# Patient Record
Sex: FEMALE | Race: ASIAN | Employment: FULL TIME | URBAN - METROPOLITAN AREA
[De-identification: names, ages, dates, MRNs, and addresses within clinical notes are randomized per-mention and may not be internally consistent; named-entity substitution may affect disease eponyms.]

---

## 2020-09-24 ENCOUNTER — TELEPHONE (OUTPATIENT)
Dept: INTERNAL MEDICINE CLINIC | Facility: CLINIC | Age: 45
End: 2020-09-24

## 2020-09-24 ENCOUNTER — OFFICE VISIT (OUTPATIENT)
Dept: INTERNAL MEDICINE CLINIC | Facility: CLINIC | Age: 45
End: 2020-09-24
Payer: COMMERCIAL

## 2020-09-24 VITALS
HEART RATE: 80 BPM | HEIGHT: 62.75 IN | TEMPERATURE: 99 F | WEIGHT: 152 LBS | DIASTOLIC BLOOD PRESSURE: 66 MMHG | BODY MASS INDEX: 27.27 KG/M2 | SYSTOLIC BLOOD PRESSURE: 100 MMHG | RESPIRATION RATE: 16 BRPM | OXYGEN SATURATION: 100 %

## 2020-09-24 DIAGNOSIS — Z00.00 ENCOUNTER FOR ROUTINE ADULT MEDICAL EXAMINATION: Primary | ICD-10-CM

## 2020-09-24 DIAGNOSIS — Z00.00 LABORATORY EXAM ORDERED AS PART OF ROUTINE GENERAL MEDICAL EXAMINATION: ICD-10-CM

## 2020-09-24 DIAGNOSIS — E78.5 DYSLIPIDEMIA: ICD-10-CM

## 2020-09-24 DIAGNOSIS — Z12.31 ENCOUNTER FOR SCREENING MAMMOGRAM FOR BREAST CANCER: ICD-10-CM

## 2020-09-24 DIAGNOSIS — Z23 NEED FOR INFLUENZA VACCINATION: ICD-10-CM

## 2020-09-24 DIAGNOSIS — B36.9 FUNGAL RASH OF TRUNK: ICD-10-CM

## 2020-09-24 PROCEDURE — 3008F BODY MASS INDEX DOCD: CPT | Performed by: INTERNAL MEDICINE

## 2020-09-24 PROCEDURE — 3078F DIAST BP <80 MM HG: CPT | Performed by: INTERNAL MEDICINE

## 2020-09-24 PROCEDURE — 3074F SYST BP LT 130 MM HG: CPT | Performed by: INTERNAL MEDICINE

## 2020-09-24 PROCEDURE — 90686 IIV4 VACC NO PRSV 0.5 ML IM: CPT | Performed by: INTERNAL MEDICINE

## 2020-09-24 PROCEDURE — 99386 PREV VISIT NEW AGE 40-64: CPT | Performed by: INTERNAL MEDICINE

## 2020-09-24 PROCEDURE — 90471 IMMUNIZATION ADMIN: CPT | Performed by: INTERNAL MEDICINE

## 2020-09-24 RX ORDER — ATORVASTATIN CALCIUM 10 MG/1
TABLET, FILM COATED ORAL
COMMUNITY
End: 2020-12-03

## 2020-09-24 NOTE — TELEPHONE ENCOUNTER
Faxed signed medical records request to Dr. Amilcar Figueroa office to obtain pt's medical records     Fax number [phone]     Fax confirmation received     Sent to scan     Awaiting fax

## 2020-09-24 NOTE — PROGRESS NOTES
[facility] Internal Medicine Office Note  Chief Complaint:   Patient presents with:  New Patient  Physical      HPI:   This is a 50year old female coming in for establishing care and physical  HPI    PMH: high cholesterol, on atorvastatin  [facility] Position: Sitting, Cuff Size: adult)   Pulse 80   Temp 98.6 °F (37 °C) (Oral)   Resp 16   Ht 62.75\"   Wt 152 lb (68.9 kg)   SpO2 100%   BMI 27.14 kg/m²  Estimated body mass index is 27.14 kg/m² as calculated from the following:    Height as of this encoun Future  -     COMP METABOLIC PANEL (14); Future    Encounter for screening mammogram for breast cancer  -     OBG - INTERNAL  -     [facility] CIRO 2D+3D SCREENING BILAT (CPT=77067/14050);  Future    Fungal rash of trunk -start otc clotrimazole and f/u derm if per

## 2020-09-24 NOTE — PATIENT INSTRUCTIONS
Call central scheduling to make appointment for mammogram and blood work [phone]    Follow up with gynecology for pap smear       Clotrimazole 1% over the counter - apply twice daily to affected area   Follow up with dermatology if symptoms persist

## 2020-09-30 ENCOUNTER — TELEPHONE (OUTPATIENT)
Dept: INTERNAL MEDICINE CLINIC | Facility: CLINIC | Age: 45
End: 2020-09-30

## 2020-09-30 NOTE — TELEPHONE ENCOUNTER
Please call patient that we have not received blood work results that are required for her form.  Please remind her to schedule lab appt

## 2020-10-02 ENCOUNTER — HOSPITAL ENCOUNTER (OUTPATIENT)
Dept: MAMMOGRAPHY | Age: 45
Discharge: HOME | End: 2020-10-02
Attending: INTERNAL MEDICINE
Payer: COMMERCIAL

## 2020-10-02 ENCOUNTER — LAB ENCOUNTER (OUTPATIENT)
Dept: LAB | Age: 45
End: 2020-10-02
Attending: INTERNAL MEDICINE
Payer: COMMERCIAL

## 2020-10-02 DIAGNOSIS — Z12.31 ENCOUNTER FOR SCREENING MAMMOGRAM FOR BREAST CANCER: ICD-10-CM

## 2020-10-02 DIAGNOSIS — Z00.00 LABORATORY EXAM ORDERED AS PART OF ROUTINE GENERAL MEDICAL EXAMINATION: ICD-10-CM

## 2020-10-02 PROCEDURE — 36415 COLL VENOUS BLD VENIPUNCTURE: CPT

## 2020-10-02 PROCEDURE — 84443 ASSAY THYROID STIM HORMONE: CPT

## 2020-10-02 PROCEDURE — 80053 COMPREHEN METABOLIC PANEL: CPT

## 2020-10-02 PROCEDURE — 85025 COMPLETE CBC W/AUTO DIFF WBC: CPT

## 2020-10-02 PROCEDURE — 77067 SCR MAMMO BI INCL CAD: CPT | Performed by: INTERNAL MEDICINE

## 2020-10-02 PROCEDURE — 77063 BREAST TOMOSYNTHESIS BI: CPT | Performed by: INTERNAL MEDICINE

## 2020-10-02 PROCEDURE — 80061 LIPID PANEL: CPT

## 2020-10-06 NOTE — TELEPHONE ENCOUNTER
Called and left message for pt to give office a call back     BP Physician Certification Form completed    Would pt like for us to fax form to the number provided and mail copy to her? OR   Would she like to ?

## 2020-10-06 NOTE — TELEPHONE ENCOUNTER
Faxed wellness form to [employer]     Fax number [phone]     Fax confirmation received     Sent to scan, copy placed in accordion   Copy mailed to pt address on file

## 2020-12-03 RX ORDER — ATORVASTATIN CALCIUM 10 MG/1
TABLET, FILM COATED ORAL
Qty: 90 TABLET | Refills: 3 | Status: SHIPPED | OUTPATIENT
Start: 2020-12-03 | End: 2021-11-26

## 2020-12-03 NOTE — TELEPHONE ENCOUNTER
Patient is requesting a refill on her prescription for atorvastatin 10 MG Oral Tab. [facility] [phone], [phone].

## 2020-12-03 NOTE — TELEPHONE ENCOUNTER
LOV: 9/24/2020 with Dr. Radha Reynoso  RTC: no follow-up on file  Last Relevant Labs: 10/2/2020   Historical document in med list.     Future Appointments   Date Time Provider Esha Castaneda   1/14/2021  3:30 PM Flo Ornelas MD [facility]

## 2021-03-17 ENCOUNTER — TELEPHONE (OUTPATIENT)
Dept: INTERNAL MEDICINE CLINIC | Facility: CLINIC | Age: 46
End: 2021-03-17

## 2021-03-17 NOTE — TELEPHONE ENCOUNTER
Patient wondering if she is safe to get covid vaccine due to blood thinners during first pregnancy 12 years ago.  Please advise

## 2021-08-05 ENCOUNTER — OFFICE VISIT (OUTPATIENT)
Dept: OBGYN CLINIC | Facility: CLINIC | Age: 46
End: 2021-08-05
Payer: COMMERCIAL

## 2021-08-05 VITALS
SYSTOLIC BLOOD PRESSURE: 112 MMHG | WEIGHT: 149.38 LBS | DIASTOLIC BLOOD PRESSURE: 66 MMHG | HEIGHT: 62.5 IN | BODY MASS INDEX: 26.8 KG/M2

## 2021-08-05 DIAGNOSIS — Z12.31 ENCOUNTER FOR SCREENING MAMMOGRAM FOR BREAST CANCER: ICD-10-CM

## 2021-08-05 DIAGNOSIS — Z01.419 WELL WOMAN EXAM WITH ROUTINE GYNECOLOGICAL EXAM: Primary | ICD-10-CM

## 2021-08-05 DIAGNOSIS — Z12.4 CERVICAL CANCER SCREENING: ICD-10-CM

## 2021-08-05 PROCEDURE — 3078F DIAST BP <80 MM HG: CPT | Performed by: NURSE PRACTITIONER

## 2021-08-05 PROCEDURE — 3008F BODY MASS INDEX DOCD: CPT | Performed by: NURSE PRACTITIONER

## 2021-08-05 PROCEDURE — 87624 HPV HI-RISK TYP POOLED RSLT: CPT | Performed by: NURSE PRACTITIONER

## 2021-08-05 PROCEDURE — 99386 PREV VISIT NEW AGE 40-64: CPT | Performed by: NURSE PRACTITIONER

## 2021-08-05 PROCEDURE — 3074F SYST BP LT 130 MM HG: CPT | Performed by: NURSE PRACTITIONER

## 2021-08-05 PROCEDURE — 88175 CYTOPATH C/V AUTO FLUID REDO: CPT | Performed by: NURSE PRACTITIONER

## 2021-08-05 NOTE — PROGRESS NOTES
Here for new gynecology visit. 48year old G 4 P 2. Patient's last menstrual period was 05/07/2021. [facility] Here for Annual Gynecologic Exam. No concerns or questions. Low back bone pain as well as left hip pain when she lays on it.     Menses have been irre past.  Extremities:  No pain, swelling, arthralgias, joint swelling. Neurological:  No headaches, depression, anxiety, migraines, seizure disorders, behavioral problems.                /66   Ht 62.5\"   Wt 149 lb 6.4 oz (67.8 kg)   LMP 05/07/2021   B

## 2021-08-12 LAB — HPV I/H RISK 1 DNA SPEC QL NAA+PROBE: NEGATIVE

## 2021-08-30 ENCOUNTER — OFFICE VISIT (OUTPATIENT)
Dept: INTERNAL MEDICINE CLINIC | Facility: CLINIC | Age: 46
End: 2021-08-30
Payer: COMMERCIAL

## 2021-08-30 VITALS
DIASTOLIC BLOOD PRESSURE: 66 MMHG | WEIGHT: 153.63 LBS | RESPIRATION RATE: 16 BRPM | SYSTOLIC BLOOD PRESSURE: 122 MMHG | TEMPERATURE: 99 F | BODY MASS INDEX: 27.57 KG/M2 | OXYGEN SATURATION: 100 % | HEART RATE: 80 BPM | HEIGHT: 62.5 IN

## 2021-08-30 DIAGNOSIS — B35.4 TINEA CORPORIS: ICD-10-CM

## 2021-08-30 DIAGNOSIS — M25.552 LEFT HIP PAIN: Primary | ICD-10-CM

## 2021-08-30 DIAGNOSIS — Z00.00 LABORATORY EXAM ORDERED AS PART OF ROUTINE GENERAL MEDICAL EXAMINATION: ICD-10-CM

## 2021-08-30 DIAGNOSIS — M25.572 ACUTE LEFT ANKLE PAIN: ICD-10-CM

## 2021-08-30 PROCEDURE — 3074F SYST BP LT 130 MM HG: CPT | Performed by: INTERNAL MEDICINE

## 2021-08-30 PROCEDURE — 99213 OFFICE O/P EST LOW 20 MIN: CPT | Performed by: INTERNAL MEDICINE

## 2021-08-30 PROCEDURE — 3008F BODY MASS INDEX DOCD: CPT | Performed by: INTERNAL MEDICINE

## 2021-08-30 PROCEDURE — 3078F DIAST BP <80 MM HG: CPT | Performed by: INTERNAL MEDICINE

## 2021-08-30 RX ORDER — CLOTRIMAZOLE 1 %
CREAM (GRAM) TOPICAL
Qty: 40 G | Refills: 0 | Status: SHIPPED | OUTPATIENT
Start: 2021-08-30

## 2021-08-30 RX ORDER — MULTIVIT-MIN/IRON/FOLIC ACID/K 18-600-40
CAPSULE ORAL
COMMUNITY
Start: 2020-10-31

## 2021-08-30 NOTE — PROGRESS NOTES
[facility] Internal Medicine Office Note  Chief Complaint:   Patient presents with:  Physical      HPI:   This is a 48year old female coming in for physical     HPI    She had pap smear 8/2021 with gynecology    covid 3/19/21 and 4/9/21 Pfizer disturbance. Respiratory: Negative for shortness of breath. Cardiovascular: Negative for chest pain. Gastrointestinal: Negative for constipation. Genitourinary: Negative for dysuria. Neurological: Negative. Hematological: Negative.     Psychia pains are due to the statin. Ok to trial off of statin for 2 weeks   -     ORTHOPEDIC - INTERNAL    Acute left ankle pain - for past 2 days; anti-inflammatory as above. Normal exam, normal ROM.    -     ORTHOPEDIC - INTERNAL    Laboratory exam ordered as pa

## 2021-08-30 NOTE — PATIENT INSTRUCTIONS
Stop atorvastatin for 2 weeks and re-assess joint pains    Aleve/naproxen recommended twice daily for 7-10 days and take with food

## 2021-09-25 ENCOUNTER — LAB ENCOUNTER (OUTPATIENT)
Dept: LAB | Age: 46
End: 2021-09-25
Attending: INTERNAL MEDICINE
Payer: COMMERCIAL

## 2021-09-25 DIAGNOSIS — Z00.00 LABORATORY EXAM ORDERED AS PART OF ROUTINE GENERAL MEDICAL EXAMINATION: ICD-10-CM

## 2021-09-25 LAB
ALBUMIN SERPL-MCNC: 3.4 G/DL (ref 3.4–5)
ALBUMIN/GLOB SERPL: 0.8 {RATIO} (ref 1–2)
ALP LIVER SERPL-CCNC: 28 U/L
ALT SERPL-CCNC: 24 U/L
ANION GAP SERPL CALC-SCNC: 3 MMOL/L (ref 0–18)
AST SERPL-CCNC: 14 U/L (ref 15–37)
BASOPHILS # BLD AUTO: 0.04 X10(3) UL (ref 0–0.2)
BASOPHILS NFR BLD AUTO: 0.6 %
BILIRUB SERPL-MCNC: 0.5 MG/DL (ref 0.1–2)
BUN BLD-MCNC: 9 MG/DL (ref 7–18)
CALCIUM BLD-MCNC: 8.6 MG/DL (ref 8.5–10.1)
CHLORIDE SERPL-SCNC: 109 MMOL/L (ref 98–112)
CHOLEST SERPL-MCNC: 173 MG/DL (ref ?–200)
CO2 SERPL-SCNC: 26 MMOL/L (ref 21–32)
CREAT BLD-MCNC: 0.48 MG/DL
EOSINOPHIL # BLD AUTO: 0.08 X10(3) UL (ref 0–0.7)
EOSINOPHIL NFR BLD AUTO: 1.3 %
ERYTHROCYTE [DISTWIDTH] IN BLOOD BY AUTOMATED COUNT: 13.3 %
GLOBULIN PLAS-MCNC: 4.3 G/DL (ref 2.8–4.4)
GLUCOSE BLD-MCNC: 90 MG/DL (ref 70–99)
HCT VFR BLD AUTO: 38.9 %
HDLC SERPL-MCNC: 56 MG/DL (ref 40–59)
HGB BLD-MCNC: 12.8 G/DL
IMM GRANULOCYTES # BLD AUTO: 0.01 X10(3) UL (ref 0–1)
IMM GRANULOCYTES NFR BLD: 0.2 %
LDLC SERPL CALC-MCNC: 105 MG/DL (ref ?–100)
LYMPHOCYTES # BLD AUTO: 2.88 X10(3) UL (ref 1–4)
LYMPHOCYTES NFR BLD AUTO: 45.8 %
MCH RBC QN AUTO: 28.6 PG (ref 26–34)
MCHC RBC AUTO-ENTMCNC: 32.9 G/DL (ref 31–37)
MCV RBC AUTO: 86.8 FL
MONOCYTES # BLD AUTO: 0.42 X10(3) UL (ref 0.1–1)
MONOCYTES NFR BLD AUTO: 6.7 %
NEUTROPHILS # BLD AUTO: 2.86 X10 (3) UL (ref 1.5–7.7)
NEUTROPHILS # BLD AUTO: 2.86 X10(3) UL (ref 1.5–7.7)
NEUTROPHILS NFR BLD AUTO: 45.4 %
NONHDLC SERPL-MCNC: 117 MG/DL (ref ?–130)
OSMOLALITY SERPL CALC.SUM OF ELEC: 284 MOSM/KG (ref 275–295)
PATIENT FASTING Y/N/NP: YES
PATIENT FASTING Y/N/NP: YES
PLATELET # BLD AUTO: 234 10(3)UL (ref 150–450)
POTASSIUM SERPL-SCNC: 4.1 MMOL/L (ref 3.5–5.1)
PROT SERPL-MCNC: 7.7 G/DL (ref 6.4–8.2)
RBC # BLD AUTO: 4.48 X10(6)UL
SODIUM SERPL-SCNC: 138 MMOL/L (ref 136–145)
TRIGL SERPL-MCNC: 61 MG/DL (ref 30–149)
TSI SER-ACNC: 1.7 MIU/ML (ref 0.36–3.74)
VLDLC SERPL CALC-MCNC: 10 MG/DL (ref 0–30)
WBC # BLD AUTO: 6.3 X10(3) UL (ref 4–11)

## 2021-09-25 PROCEDURE — 80053 COMPREHEN METABOLIC PANEL: CPT

## 2021-09-25 PROCEDURE — 84443 ASSAY THYROID STIM HORMONE: CPT

## 2021-09-25 PROCEDURE — 36415 COLL VENOUS BLD VENIPUNCTURE: CPT

## 2021-09-25 PROCEDURE — 80061 LIPID PANEL: CPT

## 2021-09-25 PROCEDURE — 85025 COMPLETE CBC W/AUTO DIFF WBC: CPT

## 2021-09-28 ENCOUNTER — TELEPHONE (OUTPATIENT)
Dept: INTERNAL MEDICINE CLINIC | Facility: CLINIC | Age: 46
End: 2021-09-28

## 2021-10-01 NOTE — TELEPHONE ENCOUNTER
Patient needs to measure her waist circumference in inches so we can add it to the form.      Form is signed and in my out-box

## 2021-10-01 NOTE — TELEPHONE ENCOUNTER
Notified patient and she measured her waist at 34\". Patient requesting original form to be mailed to her home address AND fax form to [address] (Fax #[phone])-- info located at bottom of form. Task done.

## 2021-10-07 ENCOUNTER — HOSPITAL ENCOUNTER (OUTPATIENT)
Dept: MAMMOGRAPHY | Age: 46
Discharge: HOME | End: 2021-10-07
Attending: NURSE PRACTITIONER
Payer: COMMERCIAL

## 2021-10-07 DIAGNOSIS — Z12.31 ENCOUNTER FOR SCREENING MAMMOGRAM FOR BREAST CANCER: ICD-10-CM

## 2021-10-07 PROCEDURE — 77063 BREAST TOMOSYNTHESIS BI: CPT | Performed by: NURSE PRACTITIONER

## 2021-10-07 PROCEDURE — 77067 SCR MAMMO BI INCL CAD: CPT | Performed by: NURSE PRACTITIONER

## 2021-10-21 ENCOUNTER — HOSPITAL ENCOUNTER (OUTPATIENT)
Dept: ULTRASOUND IMAGING | Age: 46
Discharge: HOME | End: 2021-10-21
Attending: NURSE PRACTITIONER
Payer: COMMERCIAL

## 2021-10-21 ENCOUNTER — HOSPITAL ENCOUNTER (OUTPATIENT)
Dept: MAMMOGRAPHY | Age: 46
Discharge: HOME | End: 2021-10-21
Attending: NURSE PRACTITIONER
Payer: COMMERCIAL

## 2021-10-21 DIAGNOSIS — R92.2 INCONCLUSIVE MAMMOGRAM: ICD-10-CM

## 2021-10-21 PROCEDURE — 77062 BREAST TOMOSYNTHESIS BI: CPT | Performed by: NURSE PRACTITIONER

## 2021-10-21 PROCEDURE — 77066 DX MAMMO INCL CAD BI: CPT | Performed by: NURSE PRACTITIONER

## 2021-10-21 PROCEDURE — 76642 ULTRASOUND BREAST LIMITED: CPT | Performed by: NURSE PRACTITIONER

## 2021-11-26 RX ORDER — ATORVASTATIN CALCIUM 10 MG/1
TABLET, FILM COATED ORAL
Qty: 90 TABLET | Refills: 3 | Status: SHIPPED | OUTPATIENT
Start: 2021-11-26

## 2022-04-29 ENCOUNTER — HOSPITAL ENCOUNTER (OUTPATIENT)
Dept: MAMMOGRAPHY | Age: 47
Discharge: HOME | End: 2022-04-29
Attending: NURSE PRACTITIONER
Payer: COMMERCIAL

## 2022-04-29 ENCOUNTER — HOSPITAL ENCOUNTER (OUTPATIENT)
Dept: ULTRASOUND IMAGING | Age: 47
Discharge: HOME | End: 2022-04-29
Attending: NURSE PRACTITIONER
Payer: COMMERCIAL

## 2022-04-29 DIAGNOSIS — N63.0 BREAST NODULE: ICD-10-CM

## 2022-04-29 DIAGNOSIS — R92.1 BREAST CALCIFICATIONS: ICD-10-CM

## 2022-04-29 PROCEDURE — 76642 ULTRASOUND BREAST LIMITED: CPT | Performed by: NURSE PRACTITIONER

## 2022-04-29 PROCEDURE — 77062 BREAST TOMOSYNTHESIS BI: CPT | Performed by: NURSE PRACTITIONER

## 2022-04-29 PROCEDURE — 77066 DX MAMMO INCL CAD BI: CPT | Performed by: NURSE PRACTITIONER

## 2022-08-08 ENCOUNTER — TELEPHONE (OUTPATIENT)
Dept: INTERNAL MEDICINE CLINIC | Facility: CLINIC | Age: 47
End: 2022-08-08

## 2022-08-08 DIAGNOSIS — Z00.00 LABORATORY EXAM ORDERED AS PART OF ROUTINE GENERAL MEDICAL EXAMINATION: Primary | ICD-10-CM

## 2022-08-08 NOTE — TELEPHONE ENCOUNTER
Patient requesting lab orders prior to visit, [facility] lab    Future Appointments   Date Time Provider Esha Tonya   8/12/2022  1:30 PM Rey Groves MD [facility] [place]

## 2022-08-10 ENCOUNTER — LAB ENCOUNTER (OUTPATIENT)
Dept: LAB | Facility: HOSPITAL | Age: 47
End: 2022-08-10
Attending: INTERNAL MEDICINE
Payer: COMMERCIAL

## 2022-08-10 DIAGNOSIS — Z00.00 LABORATORY EXAM ORDERED AS PART OF ROUTINE GENERAL MEDICAL EXAMINATION: ICD-10-CM

## 2022-08-10 LAB
ALBUMIN SERPL-MCNC: 3.8 G/DL (ref 3.4–5)
ALBUMIN/GLOB SERPL: 0.9 {RATIO} (ref 1–2)
ALP LIVER SERPL-CCNC: 31 U/L
ALT SERPL-CCNC: 22 U/L
ANION GAP SERPL CALC-SCNC: 4 MMOL/L (ref 0–18)
AST SERPL-CCNC: 15 U/L (ref 15–37)
BASOPHILS # BLD AUTO: 0.03 X10(3) UL (ref 0–0.2)
BASOPHILS NFR BLD AUTO: 0.5 %
BILIRUB SERPL-MCNC: 0.7 MG/DL (ref 0.1–2)
BUN BLD-MCNC: 9 MG/DL (ref 7–18)
CALCIUM BLD-MCNC: 9.4 MG/DL (ref 8.5–10.1)
CHLORIDE SERPL-SCNC: 109 MMOL/L (ref 98–112)
CHOLEST SERPL-MCNC: 163 MG/DL (ref ?–200)
CO2 SERPL-SCNC: 27 MMOL/L (ref 21–32)
CREAT BLD-MCNC: 0.48 MG/DL
EOSINOPHIL # BLD AUTO: 0.09 X10(3) UL (ref 0–0.7)
EOSINOPHIL NFR BLD AUTO: 1.4 %
ERYTHROCYTE [DISTWIDTH] IN BLOOD BY AUTOMATED COUNT: 12.4 %
FASTING PATIENT LIPID ANSWER: YES
FASTING STATUS PATIENT QL REPORTED: YES
GFR SERPLBLD BASED ON 1.73 SQ M-ARVRAT: 116 ML/MIN/1.73M2 (ref 60–?)
GLOBULIN PLAS-MCNC: 4.3 G/DL (ref 2.8–4.4)
GLUCOSE BLD-MCNC: 92 MG/DL (ref 70–99)
HCT VFR BLD AUTO: 39.4 %
HDLC SERPL-MCNC: 61 MG/DL (ref 40–59)
HGB BLD-MCNC: 12.9 G/DL
IMM GRANULOCYTES # BLD AUTO: 0.02 X10(3) UL (ref 0–1)
IMM GRANULOCYTES NFR BLD: 0.3 %
LDLC SERPL CALC-MCNC: 87 MG/DL (ref ?–100)
LYMPHOCYTES # BLD AUTO: 2.57 X10(3) UL (ref 1–4)
LYMPHOCYTES NFR BLD AUTO: 41.2 %
MCH RBC QN AUTO: 28.4 PG (ref 26–34)
MCHC RBC AUTO-ENTMCNC: 32.7 G/DL (ref 31–37)
MCV RBC AUTO: 86.8 FL
MONOCYTES # BLD AUTO: 0.38 X10(3) UL (ref 0.1–1)
MONOCYTES NFR BLD AUTO: 6.1 %
NEUTROPHILS # BLD AUTO: 3.15 X10 (3) UL (ref 1.5–7.7)
NEUTROPHILS # BLD AUTO: 3.15 X10(3) UL (ref 1.5–7.7)
NEUTROPHILS NFR BLD AUTO: 50.5 %
NONHDLC SERPL-MCNC: 102 MG/DL (ref ?–130)
OSMOLALITY SERPL CALC.SUM OF ELEC: 288 MOSM/KG (ref 275–295)
PLATELET # BLD AUTO: 219 10(3)UL (ref 150–450)
POTASSIUM SERPL-SCNC: 4.1 MMOL/L (ref 3.5–5.1)
PROT SERPL-MCNC: 8.1 G/DL (ref 6.4–8.2)
RBC # BLD AUTO: 4.54 X10(6)UL
SODIUM SERPL-SCNC: 140 MMOL/L (ref 136–145)
TRIGL SERPL-MCNC: 81 MG/DL (ref 30–149)
TSI SER-ACNC: 1.78 MIU/ML (ref 0.36–3.74)
VLDLC SERPL CALC-MCNC: 13 MG/DL (ref 0–30)
WBC # BLD AUTO: 6.2 X10(3) UL (ref 4–11)

## 2022-08-10 PROCEDURE — 85025 COMPLETE CBC W/AUTO DIFF WBC: CPT

## 2022-08-10 PROCEDURE — 36415 COLL VENOUS BLD VENIPUNCTURE: CPT

## 2022-08-10 PROCEDURE — 84443 ASSAY THYROID STIM HORMONE: CPT

## 2022-08-10 PROCEDURE — 80053 COMPREHEN METABOLIC PANEL: CPT

## 2022-08-10 PROCEDURE — 80061 LIPID PANEL: CPT

## 2022-08-12 ENCOUNTER — PATIENT MESSAGE (OUTPATIENT)
Dept: INTERNAL MEDICINE CLINIC | Facility: CLINIC | Age: 47
End: 2022-08-12

## 2022-08-12 ENCOUNTER — OFFICE VISIT (OUTPATIENT)
Dept: INTERNAL MEDICINE CLINIC | Facility: CLINIC | Age: 47
End: 2022-08-12
Payer: COMMERCIAL

## 2022-08-12 VITALS
HEART RATE: 64 BPM | RESPIRATION RATE: 16 BRPM | BODY MASS INDEX: 27.44 KG/M2 | OXYGEN SATURATION: 99 % | DIASTOLIC BLOOD PRESSURE: 70 MMHG | WEIGHT: 151 LBS | HEIGHT: 62.25 IN | TEMPERATURE: 99 F | SYSTOLIC BLOOD PRESSURE: 102 MMHG

## 2022-08-12 DIAGNOSIS — E78.5 DYSLIPIDEMIA: ICD-10-CM

## 2022-08-12 DIAGNOSIS — Z12.11 SCREENING FOR COLON CANCER: ICD-10-CM

## 2022-08-12 DIAGNOSIS — L98.9 SKIN LESION: ICD-10-CM

## 2022-08-12 DIAGNOSIS — Z00.00 ENCOUNTER FOR ROUTINE ADULT MEDICAL EXAMINATION: Primary | ICD-10-CM

## 2022-08-12 PROCEDURE — 99396 PREV VISIT EST AGE 40-64: CPT | Performed by: INTERNAL MEDICINE

## 2022-08-12 PROCEDURE — 3008F BODY MASS INDEX DOCD: CPT | Performed by: INTERNAL MEDICINE

## 2022-08-12 PROCEDURE — 3074F SYST BP LT 130 MM HG: CPT | Performed by: INTERNAL MEDICINE

## 2022-08-12 PROCEDURE — 3078F DIAST BP <80 MM HG: CPT | Performed by: INTERNAL MEDICINE

## 2022-08-16 ENCOUNTER — OFFICE VISIT (OUTPATIENT)
Dept: INTERNAL MEDICINE CLINIC | Facility: CLINIC | Age: 47
End: 2022-08-16
Payer: COMMERCIAL

## 2022-08-16 VITALS
WEIGHT: 152.38 LBS | RESPIRATION RATE: 15 BRPM | TEMPERATURE: 98 F | OXYGEN SATURATION: 99 % | SYSTOLIC BLOOD PRESSURE: 106 MMHG | BODY MASS INDEX: 28 KG/M2 | HEART RATE: 75 BPM | DIASTOLIC BLOOD PRESSURE: 60 MMHG

## 2022-08-16 DIAGNOSIS — M25.522 LEFT ELBOW PAIN: ICD-10-CM

## 2022-08-16 DIAGNOSIS — M25.552 LEFT HIP PAIN: Primary | ICD-10-CM

## 2022-08-16 PROCEDURE — 3074F SYST BP LT 130 MM HG: CPT | Performed by: INTERNAL MEDICINE

## 2022-08-16 PROCEDURE — 3078F DIAST BP <80 MM HG: CPT | Performed by: INTERNAL MEDICINE

## 2022-08-16 PROCEDURE — 99213 OFFICE O/P EST LOW 20 MIN: CPT | Performed by: INTERNAL MEDICINE

## 2022-08-16 NOTE — PATIENT INSTRUCTIONS
Please apply ice for 20 minutes three times a day  You can take ibuprofen up to 400 mg three times a day with food for no more than 10 days to decrease the swelling.  Please notify your dentist that ibuprofen is recommended as sometimes before procedures they do not want you to take any ibuprofen

## 2022-11-18 ENCOUNTER — TELEPHONE (OUTPATIENT)
Dept: OBGYN CLINIC | Facility: CLINIC | Age: 47
End: 2022-11-18

## 2022-11-18 DIAGNOSIS — R92.2 INCONCLUSIVE MAMMOGRAM: Primary | ICD-10-CM

## 2022-11-18 NOTE — TELEPHONE ENCOUNTER
Last OV: 8/5/21 with Bessy Sierra for annual  Last mammogram: 4/29/22 diagnostic mammo with ultrasound, birads 3- recommend f/u bilateral diagnostic mammo in 6 mos  Next appt: 1/12/23    diagnostic mammogram ordered per protocol. Patient notified via [employer].

## 2022-11-18 NOTE — TELEPHONE ENCOUNTER
Patient scheduled Annual and would like order sent over for mammogram    Future Appointments   Date Time Provider Esha Castaneda   1/12/2023  1:45 PM BESSIE Ortiz [facility] [address]   2/2/2023 10:30 AM Alecia Olson MD [facility]

## 2022-11-25 RX ORDER — ATORVASTATIN CALCIUM 10 MG/1
TABLET, FILM COATED ORAL
Qty: 90 TABLET | Refills: 1 | Status: SHIPPED | OUTPATIENT
Start: 2022-11-25

## 2022-12-01 ENCOUNTER — APPOINTMENT (OUTPATIENT)
Dept: ULTRASOUND IMAGING | Age: 47
End: 2022-12-01
Attending: NURSE PRACTITIONER
Payer: COMMERCIAL

## 2022-12-01 ENCOUNTER — HOSPITAL ENCOUNTER (OUTPATIENT)
Dept: MAMMOGRAPHY | Age: 47
Discharge: HOME | End: 2022-12-01
Attending: NURSE PRACTITIONER
Payer: COMMERCIAL

## 2022-12-01 ENCOUNTER — HOSPITAL ENCOUNTER (OUTPATIENT)
Dept: ULTRASOUND IMAGING | Age: 47
Discharge: HOME | End: 2022-12-01
Attending: NURSE PRACTITIONER
Payer: COMMERCIAL

## 2022-12-01 DIAGNOSIS — R92.2 INCONCLUSIVE MAMMOGRAM: ICD-10-CM

## 2022-12-01 PROCEDURE — 76642 ULTRASOUND BREAST LIMITED: CPT | Performed by: NURSE PRACTITIONER

## 2022-12-01 PROCEDURE — 77066 DX MAMMO INCL CAD BI: CPT | Performed by: NURSE PRACTITIONER

## 2022-12-01 PROCEDURE — 77062 BREAST TOMOSYNTHESIS BI: CPT | Performed by: NURSE PRACTITIONER

## 2023-01-06 ENCOUNTER — OFFICE VISIT (OUTPATIENT)
Dept: INTERNAL MEDICINE CLINIC | Facility: CLINIC | Age: 48
End: 2023-01-06
Payer: COMMERCIAL

## 2023-01-06 VITALS
HEIGHT: 62.25 IN | WEIGHT: 152.63 LBS | OXYGEN SATURATION: 100 % | HEART RATE: 79 BPM | TEMPERATURE: 99 F | BODY MASS INDEX: 27.73 KG/M2 | RESPIRATION RATE: 16 BRPM | DIASTOLIC BLOOD PRESSURE: 70 MMHG | SYSTOLIC BLOOD PRESSURE: 120 MMHG

## 2023-01-06 DIAGNOSIS — M25.552 LEFT HIP PAIN: Primary | ICD-10-CM

## 2023-01-06 DIAGNOSIS — R11.0 NAUSEA: ICD-10-CM

## 2023-01-06 DIAGNOSIS — M79.602 LEFT ARM PAIN: ICD-10-CM

## 2023-01-06 PROCEDURE — 3008F BODY MASS INDEX DOCD: CPT | Performed by: INTERNAL MEDICINE

## 2023-01-06 PROCEDURE — 99213 OFFICE O/P EST LOW 20 MIN: CPT | Performed by: INTERNAL MEDICINE

## 2023-01-06 PROCEDURE — 3074F SYST BP LT 130 MM HG: CPT | Performed by: INTERNAL MEDICINE

## 2023-01-06 PROCEDURE — 3078F DIAST BP <80 MM HG: CPT | Performed by: INTERNAL MEDICINE

## 2023-01-06 RX ORDER — ONDANSETRON 4 MG/1
4 TABLET, FILM COATED ORAL EVERY 8 HOURS PRN
Qty: 10 TABLET | Refills: 0 | Status: SHIPPED | OUTPATIENT
Start: 2023-01-06

## 2023-01-07 ENCOUNTER — HOSPITAL ENCOUNTER (OUTPATIENT)
Dept: GENERAL RADIOLOGY | Age: 48
Discharge: HOME | End: 2023-01-07
Attending: INTERNAL MEDICINE
Payer: COMMERCIAL

## 2023-01-07 DIAGNOSIS — M25.552 LEFT HIP PAIN: ICD-10-CM

## 2023-01-07 PROCEDURE — 73502 X-RAY EXAM HIP UNI 2-3 VIEWS: CPT | Performed by: INTERNAL MEDICINE

## 2023-01-12 ENCOUNTER — OFFICE VISIT (OUTPATIENT)
Dept: OBGYN CLINIC | Facility: CLINIC | Age: 48
End: 2023-01-12
Payer: COMMERCIAL

## 2023-01-12 VITALS
HEIGHT: 62.5 IN | BODY MASS INDEX: 27.13 KG/M2 | SYSTOLIC BLOOD PRESSURE: 108 MMHG | WEIGHT: 151.19 LBS | DIASTOLIC BLOOD PRESSURE: 60 MMHG

## 2023-01-12 DIAGNOSIS — R92.1 BREAST CALCIFICATIONS: ICD-10-CM

## 2023-01-12 DIAGNOSIS — Z01.419 WELL WOMAN EXAM WITH ROUTINE GYNECOLOGICAL EXAM: Primary | ICD-10-CM

## 2023-01-12 PROCEDURE — 3078F DIAST BP <80 MM HG: CPT | Performed by: NURSE PRACTITIONER

## 2023-01-12 PROCEDURE — 3074F SYST BP LT 130 MM HG: CPT | Performed by: NURSE PRACTITIONER

## 2023-01-12 PROCEDURE — 99396 PREV VISIT EST AGE 40-64: CPT | Performed by: NURSE PRACTITIONER

## 2023-01-12 PROCEDURE — 3008F BODY MASS INDEX DOCD: CPT | Performed by: NURSE PRACTITIONER

## 2023-01-16 DIAGNOSIS — R93.89 ABNORMAL FINDING ON IMAGING: Primary | ICD-10-CM

## 2023-02-01 ENCOUNTER — HOSPITAL ENCOUNTER (OUTPATIENT)
Dept: ULTRASOUND IMAGING | Age: 48
Discharge: HOME | End: 2023-02-01
Attending: INTERNAL MEDICINE
Payer: COMMERCIAL

## 2023-02-01 DIAGNOSIS — R93.89 ABNORMAL FINDING ON IMAGING: ICD-10-CM

## 2023-02-01 PROCEDURE — 76830 TRANSVAGINAL US NON-OB: CPT | Performed by: INTERNAL MEDICINE

## 2023-02-01 PROCEDURE — 76856 US EXAM PELVIC COMPLETE: CPT | Performed by: INTERNAL MEDICINE

## 2023-05-16 LAB — AMB EXT COVID-19 RESULT: DETECTED

## 2023-05-17 ENCOUNTER — TELEPHONE (OUTPATIENT)
Dept: INTERNAL MEDICINE CLINIC | Facility: CLINIC | Age: 48
End: 2023-05-17

## 2023-05-17 DIAGNOSIS — U07.1 COVID-19: Primary | ICD-10-CM

## 2023-05-18 NOTE — TELEPHONE ENCOUNTER
Received call from patient, tested positive for COVID-19 this evening, symptoms started this morning. She has been dealing with high grade fevers, cough, some mild wheezing. Tmax 104. Temp came down with Tylenol but now back in 103-104 range. Will start on Paxlovid. Advised patient to go to emergency department with any persistent shortness of breath or worsening symptoms. Advised her to isolate at home for 5 days, wear mask around others 5 additional days. Patient voices understanding of my recommendations.

## 2023-05-23 RX ORDER — ATORVASTATIN CALCIUM 10 MG/1
TABLET, FILM COATED ORAL
Qty: 90 TABLET | Refills: 3 | Status: SHIPPED | OUTPATIENT
Start: 2023-05-23

## 2023-05-30 ENCOUNTER — TELEPHONE (OUTPATIENT)
Dept: INTERNAL MEDICINE CLINIC | Facility: CLINIC | Age: 48
End: 2023-05-30

## 2023-05-30 NOTE — TELEPHONE ENCOUNTER
Received on call page from patient on 5/28/23 at 10:00 pm.  Tested positive for COVID on 5/16/23. Symptoms were improving until today. C/o low grade fever, still with mild cough. Denies chest pain, SOB. Had a neg COVID test last week and took another today which is positive. Discussed not retesting for COVID as test may remain positive for up to 3 months. Discussed that she may be having a rebound of COVID symptoms vs new onset illness (likely viral). rec supportive tx.   Call office next week for appt if symptoms persist.

## 2023-08-15 ENCOUNTER — OFFICE VISIT (OUTPATIENT)
Dept: INTERNAL MEDICINE CLINIC | Facility: CLINIC | Age: 48
End: 2023-08-15
Payer: COMMERCIAL

## 2023-08-15 ENCOUNTER — HOSPITAL ENCOUNTER (OUTPATIENT)
Dept: GENERAL RADIOLOGY | Age: 48
Discharge: HOME | End: 2023-08-15
Attending: INTERNAL MEDICINE
Payer: COMMERCIAL

## 2023-08-15 VITALS
WEIGHT: 154.63 LBS | TEMPERATURE: 98 F | RESPIRATION RATE: 15 BRPM | SYSTOLIC BLOOD PRESSURE: 110 MMHG | BODY MASS INDEX: 28.46 KG/M2 | DIASTOLIC BLOOD PRESSURE: 60 MMHG | OXYGEN SATURATION: 99 % | HEIGHT: 62 IN | HEART RATE: 73 BPM

## 2023-08-15 DIAGNOSIS — M25.512 LEFT SHOULDER PAIN, UNSPECIFIED CHRONICITY: Primary | ICD-10-CM

## 2023-08-15 DIAGNOSIS — M25.512 LEFT SHOULDER PAIN, UNSPECIFIED CHRONICITY: ICD-10-CM

## 2023-08-15 DIAGNOSIS — M79.641 PAIN OF RIGHT HAND: ICD-10-CM

## 2023-08-15 PROCEDURE — 99214 OFFICE O/P EST MOD 30 MIN: CPT | Performed by: INTERNAL MEDICINE

## 2023-08-15 PROCEDURE — 3078F DIAST BP <80 MM HG: CPT | Performed by: INTERNAL MEDICINE

## 2023-08-15 PROCEDURE — 3008F BODY MASS INDEX DOCD: CPT | Performed by: INTERNAL MEDICINE

## 2023-08-15 PROCEDURE — 73030 X-RAY EXAM OF SHOULDER: CPT | Performed by: INTERNAL MEDICINE

## 2023-08-15 PROCEDURE — 3074F SYST BP LT 130 MM HG: CPT | Performed by: INTERNAL MEDICINE

## 2023-08-15 RX ORDER — MULTIVITAMIN
TABLET ORAL
COMMUNITY

## 2023-08-15 RX ORDER — MELOXICAM 7.5 MG/1
7.5 TABLET ORAL 2 TIMES DAILY PRN
Qty: 30 TABLET | Refills: 0 | Status: SHIPPED | OUTPATIENT
Start: 2023-08-15

## 2023-09-20 ENCOUNTER — TELEPHONE (OUTPATIENT)
Dept: PHYSICAL THERAPY | Facility: HOSPITAL | Age: 48
End: 2023-09-20

## 2023-09-26 ENCOUNTER — OFFICE VISIT (OUTPATIENT)
Dept: PHYSICAL THERAPY | Age: 48
End: 2023-09-26
Attending: INTERNAL MEDICINE
Payer: COMMERCIAL

## 2023-09-26 DIAGNOSIS — M79.641 PAIN OF RIGHT HAND: ICD-10-CM

## 2023-09-26 DIAGNOSIS — M25.512 LEFT SHOULDER PAIN, UNSPECIFIED CHRONICITY: Primary | ICD-10-CM

## 2023-09-26 PROCEDURE — 97161 PT EVAL LOW COMPLEX 20 MIN: CPT

## 2023-09-26 PROCEDURE — 97110 THERAPEUTIC EXERCISES: CPT

## 2023-09-27 NOTE — PROGRESS NOTES
Diagnosis:   Left shoulder pain, unspecified chronicity (M25.512)  Pain of right hand (W63.389)      Referring Provider: Neris Allred  Date of Evaluation:    9/26/23    Precautions:  None Next MD visit:   none scheduled  Date of Surgery: n/a   Insurance Primary/Secondary: [employer] INS / N/A     # Auth Visits: 10 POC            Subjective: c/o shooting pain incidents L arm yesterday. Tried the exercises and they went well overall. Pain:   5/10, at best 0/10, at worst 7-8/10    Objective:   Flexion: R 170; L 160*pain  Abduction/scap: R 160; L 120  ER: R 90; L 90 guarded  IR: R 70; L 40 *pain  Clark (+)     Pre Score Quick dash:  24/55     Assessment: Intermittent shoulder pain at times severe with AROM and functional reaching. Good tolerance for AAROM exercises. Goals:   (to be met in 10 visits)   (I) with HEP   Improved shoulder flex 160 deg for overhead reach painfree. Able to reach back for seatbelt without complaints  Improved strength to enable putting light dishes into cabinets with assist of L UE. Able to sleep uninterrupted from shoulder pain. Improved IR ROM to 60 for easier dressing. Tolerate 8 hour work day with stretch breaks with wrist/hand pain decreased at least 50%    Plan:  Patient will be seen for 1-2 x/week or a total of 10 visits over a 90 day period. Treatment will include: Manual Therapy, Neuromuscular Re-education, Therapeutic Activities, Therapeutic Exercise, and Home Exercise Program instruction. Date: 9/28/2023  TX#: 2/10 Date:                 TX#: 3/ Date:                 TX#: 4/ Date:                 TX#: 5/ Date:    Tx#: 6/   TE  Nustep 5'       Towel wall slide 10x       Wall push up 10x2       Doorway pect stretch 20\"x3  Seated trunk rot L/R in neutral 10x, in flex 10x  Manual  L shoulder GH post/inf glides, ant glides Gr II, III followed by PROM, pect minor release  Ice for soreness prn       HEP: doorway stretch, wall thoracic ext slide, forearm stretch, seated thoracic rot.     Charges: TEx2 man x 1       Total Timed Treatment: 45 min  Total Treatment Time: 45 min

## 2023-09-28 ENCOUNTER — OFFICE VISIT (OUTPATIENT)
Dept: PHYSICAL THERAPY | Age: 48
End: 2023-09-28
Attending: INTERNAL MEDICINE
Payer: COMMERCIAL

## 2023-09-28 PROCEDURE — 97110 THERAPEUTIC EXERCISES: CPT

## 2023-09-28 PROCEDURE — 97140 MANUAL THERAPY 1/> REGIONS: CPT

## 2023-10-03 ENCOUNTER — OFFICE VISIT (OUTPATIENT)
Dept: PHYSICAL THERAPY | Age: 48
End: 2023-10-03
Attending: INTERNAL MEDICINE
Payer: COMMERCIAL

## 2023-10-03 PROCEDURE — 97110 THERAPEUTIC EXERCISES: CPT

## 2023-10-03 PROCEDURE — 97140 MANUAL THERAPY 1/> REGIONS: CPT

## 2023-10-03 NOTE — PROGRESS NOTES
Diagnosis:   Left shoulder pain, unspecified chronicity (M25.512)  Pain of right hand (N78.513)      Referring Provider: Teresa Dougherty  Date of Evaluation:    9/26/23    Precautions:   Latex allergy Next MD visit:   none scheduled  Date of Surgery: n/a   Insurance Primary/Secondary: [employer] INS / N/A     # Auth Visits: 10 POC            Subjective: L shoulder less pain down the arm, still feels the tightness. Wearing the wrist brace at night, but can forget. I was able to reach the seatbelt today for the first time, carefully  Pain:   5/10, at best 0/10, at worst 7-8/10    Objective:   IR 45 deg with pain    Flexion: R 170; L 160*pain  Abduction/scap: R 160; L 120  ER: R 90; L 90 guarded  IR: R 70; L 40 *pain  Clark (+)     Pre Score Quick dash:  24/55     Assessment: Pt is starting to note improved ROM and functional day to day tasks. Good follow through with HEP    Goals:   (to be met in 10 visits)   (I) with HEP   Improved shoulder flex 160 deg for overhead reach painfree. Able to reach back for seatbelt without complaints  Improved strength to enable putting light dishes into cabinets with assist of L UE. Able to sleep uninterrupted from shoulder pain. Improved IR ROM to 60 for easier dressing. Tolerate 8 hour work day with stretch breaks with wrist/hand pain decreased at least 50%    Plan:  postural exercise. Patient will be seen for 1-2 x/week or a total of 10 visits over a 90 day period. Treatment will include: Manual Therapy, Neuromuscular Re-education, Therapeutic Activities, Therapeutic Exercise, and Home Exercise Program instruction. Date: 9/28/2023  TX#: 2/10 Date:  10/3/23               TX#: 3/10 Date:                 TX#: 4/ Date:                 TX#: 5/ Date:    Tx#: 6/   TE  Nustep 5' TE  UBE 2.5 min back/2.5 min fwd      Towel wall slide 10x Towel wall slide 10x      Wall push up 10x2 Scap sets 10 x --> orange cord single ext 10x2      Doorway pect stretch 20\"x3  Seated trunk rot L/R in neutral 10x, in flex 10x  Manual  L shoulder [address] post/inf glides, ant glides Gr II, III followed by PROM, pect minor release  Ice for soreness prn Doorway pect stretch 20\"x3    Manual  L shoulder GH post/inf glides, ant glides Gr II, III followed by PROM, pect minor release      HEP: doorway stretch, wall thoracic ext slide, forearm stretch, seated thoracic rot.      Charges: TEx2 man x 1       Total Timed Treatment: 45 min  Total Treatment Time: 45 min

## 2023-10-05 ENCOUNTER — OFFICE VISIT (OUTPATIENT)
Dept: PHYSICAL THERAPY | Age: 48
End: 2023-10-05
Attending: INTERNAL MEDICINE
Payer: COMMERCIAL

## 2023-10-09 PROBLEM — Z12.11 SPECIAL SCREENING FOR MALIGNANT NEOPLASM OF COLON: Status: ACTIVE | Noted: 2023-10-09

## 2023-10-10 ENCOUNTER — OFFICE VISIT (OUTPATIENT)
Dept: PHYSICAL THERAPY | Age: 48
End: 2023-10-10
Attending: INTERNAL MEDICINE
Payer: COMMERCIAL

## 2023-10-10 PROCEDURE — 97110 THERAPEUTIC EXERCISES: CPT

## 2023-10-10 PROCEDURE — 97140 MANUAL THERAPY 1/> REGIONS: CPT

## 2023-10-10 NOTE — PROGRESS NOTES
Diagnosis:   Left shoulder pain, unspecified chronicity (M25.512)  Pain of right hand (O79.548)      Referring Provider: Bernard Damon  Date of Evaluation:    9/26/23    Precautions:   Latex allergy Next MD visit:   none scheduled  Date of Surgery: n/a   Insurance Primary/Secondary: [employer] INS / N/A     # Auth Visits: 10 POC            Subjective: Laying on L side is still difficult. Able to reach behind the back a bit easier. Has adjusted work station which as helped with wrist/hand pain. Pain:   4/10    Objective:   L shoulder AROM  Flex 145 deg   (120 deg at eval)  IR 45 deg with pain --> improved to 60 deg post session. Clark (+)     Pre Score Quick dash:  24/55     Assessment: Improved function noted and pain is no longer constant. ROM continue to improve. Able to advance postural exercises. Good follow through with HEP    Goals:   (to be met in 10 visits)   (I) with HEP   Improved shoulder flex 160 deg for overhead reach painfree. Able to reach back for seatbelt without complaints  Improved strength to enable putting light dishes into cabinets with assist of L UE. Able to sleep uninterrupted from shoulder pain. Improved IR ROM to 60 for easier dressing. Tolerate 8 hour work day with stretch breaks with wrist/hand pain decreased at least 50%    Plan:  Modified wall slide/postural exercise. Patient will be seen for 1-2 x/week or a total of 10 visits over a 90 day period. Treatment will include: Manual Therapy, Neuromuscular Re-education, Therapeutic Activities, Therapeutic Exercise, and Home Exercise Program instruction. Date: 9/28/2023  TX#: 2/10 Date:  10/3/23               TX#: 3/10 Date:  10/10/23               TX#: 4/10 Date:                 TX#: 5/ Date:    Tx#: 6/   TE  Nustep 5' TE  UBE 2.5 min back/2.5 min fwd TE  UBE 2.5 min back/2.5 min fwd       Towel wall slide 10x Towel wall slide 10x Towel wall slide 10x     Wall push up 10x2 Scap sets 10 x --> orange cord single ext 10x2 Doorway stretch 30\"x4  Scap sets 10x --> W scap sets 10x       Doorway pect stretch 20\"x3  Seated trunk rot L/R in neutral 10x, in flex 10x  Manual  L shoulder GH post/inf glides, ant glides Gr II, III followed by PROM, pect minor release  Ice for soreness prn Doorway pect stretch 20\"x3    Manual  L shoulder GH post/inf glides, ant glides Gr II, III followed by PROM, pect minor release Wand ER 10x  HEP review    Manual  L shoulder GH post/inf glides, ant glides Gr II, III followed by PROM, pect minor release     HEP: doorway stretch, wall thoracic ext slide, forearm stretch, seated thoracic rot.      Charges: TEx2 man x 1       Total Timed Treatment: 45 min  Total Treatment Time: 45 min

## 2023-10-17 ENCOUNTER — OFFICE VISIT (OUTPATIENT)
Dept: PHYSICAL THERAPY | Age: 48
End: 2023-10-17
Attending: INTERNAL MEDICINE
Payer: COMMERCIAL

## 2023-10-17 ENCOUNTER — OFFICE VISIT (OUTPATIENT)
Dept: FAMILY MEDICINE CLINIC | Facility: CLINIC | Age: 48
End: 2023-10-17
Payer: COMMERCIAL

## 2023-10-17 VITALS
BODY MASS INDEX: 27.6 KG/M2 | HEIGHT: 62 IN | DIASTOLIC BLOOD PRESSURE: 88 MMHG | OXYGEN SATURATION: 98 % | TEMPERATURE: 99 F | WEIGHT: 150 LBS | RESPIRATION RATE: 18 BRPM | SYSTOLIC BLOOD PRESSURE: 124 MMHG | HEART RATE: 70 BPM

## 2023-10-17 DIAGNOSIS — Q84.6 NAIL ANOMALY: Primary | ICD-10-CM

## 2023-10-17 PROCEDURE — 3074F SYST BP LT 130 MM HG: CPT | Performed by: NURSE PRACTITIONER

## 2023-10-17 PROCEDURE — 3008F BODY MASS INDEX DOCD: CPT | Performed by: NURSE PRACTITIONER

## 2023-10-17 PROCEDURE — 99213 OFFICE O/P EST LOW 20 MIN: CPT | Performed by: NURSE PRACTITIONER

## 2023-10-17 PROCEDURE — 97110 THERAPEUTIC EXERCISES: CPT

## 2023-10-17 PROCEDURE — 3079F DIAST BP 80-89 MM HG: CPT | Performed by: NURSE PRACTITIONER

## 2023-10-17 NOTE — PROGRESS NOTES
Diagnosis:   Left shoulder pain, unspecified chronicity (M25.512)  Pain of right hand (K73.927)      Referring Provider: Viet Campos  Date of Evaluation:    9/26/23    Precautions:   Latex allergy Next MD visit:   none scheduled  Date of Surgery: n/a   Insurance Primary/Secondary: [employer] INS / N/A     # Auth Visits: 10 POC            Subjective: Shoulder is doing better with the mobility, able to reach better. Still some difficulty reaching out to the side. Sleeping on L side at night is still difficult. Pain:   4/10    Objective:   L shoulder AROM  Flex 145 deg   (120 deg at eval)  IR 45 deg with pain --> improved to 60 deg post session. Clark (+)     Pre Score Quick dash:  24/55     Assessment: Pt making good progress in PT with increased ROM and functional reach. Able to advance to light strengthening today tolerated well; isometric YTB with ER; advised to avoid aggravation of pain. Goals:   (to be met in 10 visits)   (I) with HEP   Improved shoulder flex 160 deg for overhead reach painfree. Able to reach back for seatbelt without complaints  Improved strength to enable putting light dishes into cabinets with assist of L UE. Able to sleep uninterrupted from shoulder pain. Improved IR ROM to 60 for easier dressing. Tolerate 8 hour work day with stretch breaks with wrist/hand pain decreased at least 50%    Plan:  Modified wall slide/postural exercise. Patient will be seen for 1-2 x/week or a total of 10 visits over a 90 day period. Treatment will include: Manual Therapy, Neuromuscular Re-education, Therapeutic Activities, Therapeutic Exercise, and Home Exercise Program instruction. Date: 9/28/2023  TX#: 2/10 Date:  10/3/23               TX#: 3/10 Date:  10/10/23               TX#: 4/10 Date:  10/17/23               TX#: 5/10 Date:    Tx#: 6/   TE  Nustep 5' TE  UBE 2.5 min back/2.5 min fwd TE  UBE 2.5 min back/2.5 min fwd   TE  UBE 2.5 min back/2.5 min fwd  SB wall walk 10x    Towel wall slide 10x Towel wall slide 10x Towel wall slide 10x SB push up 10x2    Wall push up 10x2 Scap sets 10 x --> orange cord single ext 10x2 Doorway stretch 30\"x4  Scap sets 10x --> W scap sets 10x   HEP additions/  Strengthening:  Bicep curl 4 lbs 10x2  BTB rows 20x  YTB bilat ER small arc/   Isometric 10x2    Doorway pect stretch 20\"x3  Seated trunk rot L/R in neutral 10x, in flex 10x  Manual  L shoulder GH post/inf glides, ant glides Gr II, III followed by PROM, pect minor release  Ice for soreness prn Doorway pect stretch 20\"x3    Manual  L shoulder GH post/inf glides, ant glides Gr II, III followed by PROM, pect minor release Wand ER 10x  HEP review    Manual  L shoulder GH post/inf glides, ant glides Gr II, III followed by PROM, pect minor release Wall sirena - modifed range 5x    HEP: doorway stretch, wall thoracic ext slide, forearm stretch, seated thoracic rot. 10/17 bicep, wall push up. BTB row, YTB bilat ER.  Wall slide     Charges: TEx3 Total Timed Treatment: 43 min  Total Treatment Time: 43 min

## 2023-11-02 ENCOUNTER — OFFICE VISIT (OUTPATIENT)
Dept: PHYSICAL THERAPY | Age: 48
End: 2023-11-02
Attending: INTERNAL MEDICINE
Payer: COMMERCIAL

## 2023-11-02 PROCEDURE — 97110 THERAPEUTIC EXERCISES: CPT

## 2023-11-02 NOTE — PROGRESS NOTES
Diagnosis:   Left shoulder pain, unspecified chronicity (M25.512)  Pain of right hand (C60.942)      Referring Provider: Chandni Thorpe  Date of Evaluation:    9/26/23    Precautions:   Latex allergy Next MD visit:   none scheduled  Date of Surgery: n/a   Insurance Primary/Secondary: Andrwe Martinez / N/A     # Auth Visits: 10 POC            Subjective: Reports was doing good overall with the therapy and the shoulder was good but then got sick, felt she went backwards a bit. Avoiding heavy things carrying, laying on the shoulder still difficult. 2 episodes last week tingling down the whole arm, noted driving, sitting at computer. Pain:   2/10    Objective:   11/2  L active flexion 152  UT, levator tightness L tighter than R  Cervical quadrant L (-) for radiating symptoms. Centralized pain L only. IR now thumb up to bra line R and L    10/17/23  L shoulder AROM  Flex 145 deg   (120 deg at eval)  IR 45 deg with pain --> improved to 60 deg post session. Clark (+)     Pre Score Quick dash:  24/55     Assessment: Pt returns after 2 weeks break in PT and has maintained and progressed ROM. Pain has decreased to 2/10 levels. Remaining complaints include end range pain especially overhead and difficulty laying on L side. Functionally reaching behind back has improved. Today pt has c/o new onset intermittent radiating symptoms correlated with prolonged sitting/computer; reviewed posture awareness and stretches/opening L side with light nerve glide. 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 Goals:   (to be met in 10 visits)   (I) with HEP   Improved shoulder flex 160 deg for overhead reach painfree. Able to reach back for seatbelt without complaints  Improved strength to enable putting light dishes into cabinets with assist of L UE. Able to sleep uninterrupted from shoulder pain. Improved IR ROM to 60 for easier dressing.   Tolerate 8 hour work day with stretch breaks with wrist/hand pain decreased at least 50%    Plan: Review cervical stretches and nerve glides. Continued mobilization L shoulder. Pt with 4 remaining visits if needed. Date: 9/28/2023  TX#: 2/10 Date:  10/3/23               TX#: 3/10 Date:  10/10/23               TX#: 4/10 Date:  10/17/23               TX#: 5/10 Date: 11/2/23  Tx#: 6/10   TE  Nustep 5' TE  UBE 2.5 min back/2.5 min fwd TE  UBE 2.5 min back/2.5 min fwd   TE  UBE 2.5 min back/2.5 min fwd  SB wall walk 10x TE  UBE 5'  Doorway stretch 5x  HEP review     Towel wall slide 10x Towel wall slide 10x Towel wall slide 10x SB push up 10x2 L UT, levator passive stretches  Standing light radial nerve glide 10x  PROM all planes   Wall push up 10x2 Scap sets 10 x --> orange cord single ext 10x2 Doorway stretch 30\"x4  Scap sets 10x --> W scap sets 10x   HEP additions/  Strengthening:  Bicep curl 4 lbs 10x2  BTB rows 20x  YTB bilat ER small arc/   Isometric 10x2 L shoulder GH mobes posterior GR IV   Doorway pect stretch 20\"x3  Seated trunk rot L/R in neutral 10x, in flex 10x  Manual  L shoulder GH post/inf glides, ant glides Gr II, III followed by PROM, pect minor release  Ice for soreness prn Doorway pect stretch 20\"x3    Manual  L shoulder GH post/inf glides, ant glides Gr II, III followed by PROM, pect minor release Wand ER 10x  HEP review    Manual  L shoulder GH post/inf glides, ant glides Gr II, III followed by PROM, pect minor release Wall sirena - modifed range 5x    HEP: doorway stretch, wall thoracic ext slide, forearm stretch, seated thoracic rot. 10/17 bicep, wall push up. BTB row, YTB bilat ER. Wall slide  L UT, levator stretch, radial nerve glide. Exercises in Pt Instructions.       Charges: TEx3 Total Timed Treatment: 40 min  Total Treatment Time: 40 min

## 2023-11-09 ENCOUNTER — OFFICE VISIT (OUTPATIENT)
Dept: PHYSICAL THERAPY | Age: 48
End: 2023-11-09
Attending: INTERNAL MEDICINE
Payer: COMMERCIAL

## 2023-11-09 ENCOUNTER — TELEPHONE (OUTPATIENT)
Dept: INTERNAL MEDICINE CLINIC | Facility: CLINIC | Age: 48
End: 2023-11-09

## 2023-11-09 PROCEDURE — 97140 MANUAL THERAPY 1/> REGIONS: CPT

## 2023-11-09 PROCEDURE — 97110 THERAPEUTIC EXERCISES: CPT

## 2023-11-09 NOTE — PROGRESS NOTES
Diagnosis:   Left shoulder pain, unspecified chronicity (M25.512)  Pain of right hand (L19.861)      Referring Provider: Maryam Lieberman  Date of Evaluation:    9/26/23    Precautions:   Latex allergy Next MD visit:   none scheduled  Date of Surgery: n/a   Insurance Primary/Secondary: [employer] / N/A     # Auth Visits: 10 POC            Subjective: The shoulder is doing better \"but I can still feel it\". Avoiding heavy things carrying, laying on the shoulder still difficult. Better with the tingling-will take movement breaks. Sleeping is still a problem. Pain:   1-2/10 Pain not constant    Objective:   L ER full 90 deg painfree by end of session  L active flexion 152  UT, levator tightness L tighter than R  Cervical quadrant L (-) for radiating symptoms. Centralized pain L only. IR now thumb up to bra line R and L    10/17/23  L shoulder AROM  Flex 145 deg   (120 deg at eval)  IR 45 deg with pain --> improved to 60 deg post session. Clark (+)     Pre Score Quick dash:  24/55     Assessment: Pt continues to make progress; end range pain but was able to achieve full ER by end of session. Emphasis on continued regular HEP for these final sessions. 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 Goals:   (to be met in 10 visits)   (I) with HEP   Improved shoulder flex 160 deg for overhead reach painfree. -end range pain   Able to reach back for seatbelt without complaints  Improved strength to enable putting light dishes into cabinets with assist of L UE.   -met  Able to sleep uninterrupted from shoulder pain. Improved IR ROM to 60 for easier dressing.-met  Tolerate 8 hour work day with stretch breaks with wrist/hand pain decreased at least 50%-met    Plan: 3 remaining visits to be completed over the next several weeks.         Date: 9/28/2023  TX#: 2/10 Date:  10/3/23               TX#: 3/10 Date:  10/10/23               TX#: 4/10 Date:  10/17/23               TX#: 5/10 Date: 11/2/23  Tx#: 6/10 11/923  7/10   TE  Nustep 5' TE  UBE 2.5 min back/2.5 min fwd TE  UBE 2.5 min back/2.5 min fwd   TE  UBE 2.5 min back/2.5 min fwd  SB wall walk 10x TE  UBE 5'  Doorway stretch 5x  HEP review   TE 35'  UBE 5'  Towel bilat flex 10x  Towel L scaption  Doorway pect stretch 5x  ER single in door 10x   Towel wall slide 10x Towel wall slide 10x Towel wall slide 10x SB push up 10x2 L UT, levator passive stretches  Standing light radial nerve glide 10x  PROM all planes Child's pose 3 way and thread the needle instructed  Handout updates  Sleeper stretch 5x10\"   Wall push up 10x2 Scap sets 10 x --> orange cord single ext 10x2 Doorway stretch 30\"x4  Scap sets 10x --> W scap sets 10x   HEP additions/  Strengthening:  Bicep curl 4 lbs 10x2  BTB rows 20x  YTB bilat ER small arc/   Isometric 10x2 L shoulder GH mobes posterior GR IV Manual 10'    L shoulder [address] mobes posterior GR IV with PROM   Doorway pect stretch 20\"x3  Seated trunk rot L/R in neutral 10x, in flex 10x  Manual  L shoulder GH post/inf glides, ant glides Gr II, III followed by PROM, pect minor release  Ice for soreness prn Doorway pect stretch 20\"x3    Manual  L shoulder GH post/inf glides, ant glides Gr II, III followed by PROM, pect minor release Wand ER 10x  HEP review    Manual  L shoulder GH post/inf glides, ant glides Gr II, III followed by PROM, pect minor release Wall sirena - modifed range 5x     HEP: doorway stretch, wall thoracic ext slide, forearm stretch, seated thoracic rot. 10/17 bicep, wall push up. BTB row, YTB bilat ER. Wall slide  L UT, levator stretch, radial nerve glide. Exercises in Pt Instructions. 3 way child's pose, thread the needle. Sleeper stretch.       Charges: TEx2 man x 1 Total Timed Treatment: 43 min  Total Treatment Time: 43 min

## 2023-11-10 NOTE — TELEPHONE ENCOUNTER
Future Appointments   Date Time Provider Department Center   11/27/2023  8:00 AM Keely Kelley MD [facility] 8 [facility] [place]   11/30/2023  1:30 PM Olga Romo, PT [facility]      Placed in in-basket for completion on that day

## 2023-11-27 ENCOUNTER — OFFICE VISIT (OUTPATIENT)
Dept: INTERNAL MEDICINE CLINIC | Facility: CLINIC | Age: 48
End: 2023-11-27
Payer: COMMERCIAL

## 2023-11-27 VITALS
RESPIRATION RATE: 16 BRPM | WEIGHT: 156.81 LBS | HEART RATE: 73 BPM | DIASTOLIC BLOOD PRESSURE: 76 MMHG | HEIGHT: 62 IN | TEMPERATURE: 98 F | BODY MASS INDEX: 28.86 KG/M2 | OXYGEN SATURATION: 100 % | SYSTOLIC BLOOD PRESSURE: 120 MMHG

## 2023-11-27 DIAGNOSIS — Z12.31 SCREENING MAMMOGRAM FOR BREAST CANCER: ICD-10-CM

## 2023-11-27 DIAGNOSIS — Z00.00 LABORATORY EXAM ORDERED AS PART OF ROUTINE GENERAL MEDICAL EXAMINATION: ICD-10-CM

## 2023-11-27 DIAGNOSIS — Z00.00 ENCOUNTER FOR ROUTINE ADULT MEDICAL EXAMINATION: Primary | ICD-10-CM

## 2023-11-27 DIAGNOSIS — L60.9 NAIL PROBLEM: ICD-10-CM

## 2023-11-27 PROCEDURE — 3078F DIAST BP <80 MM HG: CPT | Performed by: INTERNAL MEDICINE

## 2023-11-27 PROCEDURE — 3074F SYST BP LT 130 MM HG: CPT | Performed by: INTERNAL MEDICINE

## 2023-11-27 PROCEDURE — 99396 PREV VISIT EST AGE 40-64: CPT | Performed by: INTERNAL MEDICINE

## 2023-11-27 PROCEDURE — 3008F BODY MASS INDEX DOCD: CPT | Performed by: INTERNAL MEDICINE

## 2023-11-27 NOTE — PATIENT INSTRUCTIONS
Blood work - fasting preferred     Call to schedule mammogram [phone] - Due Dec 1, 2023 or later         Vaccines recommended:      Tdap     Shingrix

## 2023-11-28 ENCOUNTER — LAB ENCOUNTER (OUTPATIENT)
Dept: LAB | Age: 48
End: 2023-11-28
Attending: INTERNAL MEDICINE
Payer: COMMERCIAL

## 2023-11-28 DIAGNOSIS — Z00.00 LABORATORY EXAM ORDERED AS PART OF ROUTINE GENERAL MEDICAL EXAMINATION: ICD-10-CM

## 2023-11-28 DIAGNOSIS — R73.01 ELEVATED FASTING GLUCOSE: ICD-10-CM

## 2023-11-28 LAB
ALBUMIN SERPL-MCNC: 3.5 G/DL (ref 3.4–5)
ALBUMIN/GLOB SERPL: 0.8 {RATIO} (ref 1–2)
ALP LIVER SERPL-CCNC: 33 U/L
ALT SERPL-CCNC: 31 U/L
ANION GAP SERPL CALC-SCNC: 5 MMOL/L (ref 0–18)
AST SERPL-CCNC: 21 U/L (ref 15–37)
BASOPHILS # BLD AUTO: 0.04 X10(3) UL (ref 0–0.2)
BASOPHILS NFR BLD AUTO: 0.6 %
BILIRUB SERPL-MCNC: 0.6 MG/DL (ref 0.1–2)
BUN BLD-MCNC: 11 MG/DL (ref 9–23)
CALCIUM BLD-MCNC: 8.9 MG/DL (ref 8.5–10.1)
CHLORIDE SERPL-SCNC: 110 MMOL/L (ref 98–112)
CHOLEST SERPL-MCNC: 192 MG/DL (ref ?–200)
CO2 SERPL-SCNC: 26 MMOL/L (ref 21–32)
CREAT BLD-MCNC: 0.68 MG/DL
EGFRCR SERPLBLD CKD-EPI 2021: 106 ML/MIN/1.73M2 (ref 60–?)
EOSINOPHIL # BLD AUTO: 0.11 X10(3) UL (ref 0–0.7)
EOSINOPHIL NFR BLD AUTO: 1.7 %
ERYTHROCYTE [DISTWIDTH] IN BLOOD BY AUTOMATED COUNT: 13.2 %
FASTING PATIENT LIPID ANSWER: YES
FASTING STATUS PATIENT QL REPORTED: YES
GLOBULIN PLAS-MCNC: 4.3 G/DL (ref 2.8–4.4)
GLUCOSE BLD-MCNC: 101 MG/DL (ref 70–99)
HCT VFR BLD AUTO: 39.2 %
HDLC SERPL-MCNC: 62 MG/DL (ref 40–59)
HGB BLD-MCNC: 13 G/DL
IMM GRANULOCYTES # BLD AUTO: 0.02 X10(3) UL (ref 0–1)
IMM GRANULOCYTES NFR BLD: 0.3 %
LDLC SERPL CALC-MCNC: 116 MG/DL (ref ?–100)
LYMPHOCYTES # BLD AUTO: 2.99 X10(3) UL (ref 1–4)
LYMPHOCYTES NFR BLD AUTO: 46.3 %
MCH RBC QN AUTO: 27.8 PG (ref 26–34)
MCHC RBC AUTO-ENTMCNC: 33.2 G/DL (ref 31–37)
MCV RBC AUTO: 83.8 FL
MONOCYTES # BLD AUTO: 0.45 X10(3) UL (ref 0.1–1)
MONOCYTES NFR BLD AUTO: 7 %
NEUTROPHILS # BLD AUTO: 2.85 X10 (3) UL (ref 1.5–7.7)
NEUTROPHILS # BLD AUTO: 2.85 X10(3) UL (ref 1.5–7.7)
NEUTROPHILS NFR BLD AUTO: 44.1 %
NONHDLC SERPL-MCNC: 130 MG/DL (ref ?–130)
OSMOLALITY SERPL CALC.SUM OF ELEC: 292 MOSM/KG (ref 275–295)
PLATELET # BLD AUTO: 249 10(3)UL (ref 150–450)
POTASSIUM SERPL-SCNC: 4.2 MMOL/L (ref 3.5–5.1)
PROT SERPL-MCNC: 7.8 G/DL (ref 6.4–8.2)
RBC # BLD AUTO: 4.68 X10(6)UL
SODIUM SERPL-SCNC: 141 MMOL/L (ref 136–145)
TRIGL SERPL-MCNC: 77 MG/DL (ref 30–149)
TSI SER-ACNC: 1.74 MIU/ML (ref 0.36–3.74)
VLDLC SERPL CALC-MCNC: 13 MG/DL (ref 0–30)
WBC # BLD AUTO: 6.5 X10(3) UL (ref 4–11)

## 2023-11-28 PROCEDURE — 83036 HEMOGLOBIN GLYCOSYLATED A1C: CPT

## 2023-11-28 PROCEDURE — 80061 LIPID PANEL: CPT

## 2023-11-28 PROCEDURE — 36415 COLL VENOUS BLD VENIPUNCTURE: CPT

## 2023-11-28 PROCEDURE — 85025 COMPLETE CBC W/AUTO DIFF WBC: CPT

## 2023-11-28 PROCEDURE — 84443 ASSAY THYROID STIM HORMONE: CPT

## 2023-11-28 PROCEDURE — 80053 COMPREHEN METABOLIC PANEL: CPT

## 2023-11-29 ENCOUNTER — TELEPHONE (OUTPATIENT)
Dept: INTERNAL MEDICINE CLINIC | Facility: CLINIC | Age: 48
End: 2023-11-29

## 2023-11-29 DIAGNOSIS — R73.01 ELEVATED FASTING GLUCOSE: Primary | ICD-10-CM

## 2023-11-29 LAB
EST. AVERAGE GLUCOSE BLD GHB EST-MCNC: 126 MG/DL (ref 68–126)
HBA1C MFR BLD: 6 % (ref ?–5.7)

## 2023-11-29 NOTE — TELEPHONE ENCOUNTER
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

## 2023-11-30 ENCOUNTER — APPOINTMENT (OUTPATIENT)
Dept: PHYSICAL THERAPY | Age: 48
End: 2023-11-30
Attending: INTERNAL MEDICINE
Payer: COMMERCIAL

## 2023-12-04 DIAGNOSIS — R73.09 ELEVATED HEMOGLOBIN A1C: Primary | ICD-10-CM

## 2023-12-13 ENCOUNTER — TELEPHONE (OUTPATIENT)
Dept: PHYSICAL THERAPY | Facility: HOSPITAL | Age: 48
End: 2023-12-13

## 2024-02-01 ENCOUNTER — TELEPHONE (OUTPATIENT)
Dept: INTERNAL MEDICINE CLINIC | Facility: CLINIC | Age: 49
End: 2024-02-01

## 2024-02-01 DIAGNOSIS — R92.1 BREAST CALCIFICATIONS ON MAMMOGRAM: Primary | ICD-10-CM

## 2024-02-01 NOTE — TELEPHONE ENCOUNTER
LS - from mammogram results 12/1/22 recommendations:    RECOMMENDATIONS:    FOLLOW-UP DIAGNOSTIC MAMMOGRAM BILATERAL BREASTS IN 12 MONTHS.        Pended order, if appropriate. Please advise, ty!

## 2024-02-02 ENCOUNTER — HOSPITAL ENCOUNTER (OUTPATIENT)
Dept: MAMMOGRAPHY | Age: 49
Discharge: HOME | End: 2024-02-02
Attending: INTERNAL MEDICINE
Payer: COMMERCIAL

## 2024-02-02 DIAGNOSIS — Z12.31 SCREENING MAMMOGRAM FOR BREAST CANCER: ICD-10-CM

## 2024-02-02 PROCEDURE — 77067 SCR MAMMO BI INCL CAD: CPT | Performed by: INTERNAL MEDICINE

## 2024-02-02 PROCEDURE — 77063 BREAST TOMOSYNTHESIS BI: CPT | Performed by: INTERNAL MEDICINE

## 2024-02-02 NOTE — TELEPHONE ENCOUNTER
Called imaging [phone] transferred to mammogram department. Informed them that diagnostic order has been placed by LS. It looks like pt's test was done already today.   They will look into making sure the correct order is noted.

## 2024-02-16 ENCOUNTER — HOSPITAL ENCOUNTER (OUTPATIENT)
Dept: ULTRASOUND IMAGING | Age: 49
Discharge: HOME | End: 2024-02-16
Attending: INTERNAL MEDICINE
Payer: COMMERCIAL

## 2024-02-16 ENCOUNTER — HOSPITAL ENCOUNTER (OUTPATIENT)
Dept: MAMMOGRAPHY | Age: 49
Discharge: HOME | End: 2024-02-16
Attending: INTERNAL MEDICINE
Payer: COMMERCIAL

## 2024-02-16 DIAGNOSIS — R92.2 INCONCLUSIVE MAMMOGRAM: ICD-10-CM

## 2024-02-16 PROCEDURE — 76642 ULTRASOUND BREAST LIMITED: CPT | Performed by: INTERNAL MEDICINE

## 2024-02-16 PROCEDURE — 77062 BREAST TOMOSYNTHESIS BI: CPT | Performed by: INTERNAL MEDICINE

## 2024-02-16 PROCEDURE — 77066 DX MAMMO INCL CAD BI: CPT | Performed by: INTERNAL MEDICINE

## 2024-05-17 RX ORDER — ATORVASTATIN CALCIUM 10 MG/1
TABLET, FILM COATED ORAL
Qty: 90 TABLET | Refills: 3 | Status: SHIPPED | OUTPATIENT
Start: 2024-05-17

## 2024-05-17 NOTE — TELEPHONE ENCOUNTER
ATORVASTATIN TABS 10MG          Will file in chart as: ATORVASTATIN 10 MG Oral Tab    Sig: TAKE 1 TABLET DAILY    Disp: 90 tablet    Refills: 3    Start: 5/16/2024    Class: Normal    Non-formulary    Last ordered: 12 months ago (5/23/2023) by Keely Kelley MD    Last refill: 2/19/2024    Rx #: [id number]    Cholesterol Medication Protocol [id number]/16/2024 11:42 PM   Protocol Details ALT < 80    ALT resulted within past year    Lipid panel within past 12 months    In person appointment or virtual visit in the past 12 mos or appointment in next 3 mos      LOV 11/27/23  RTC 1 year  Filled 5/23/23 90 tabs 3 refills   Last labs 11/28/23  Future Appointments   Date Time Provider Department Center   5/30/2024  4:00 PM Marilu Fu APN [facility] [address]

## 2024-05-18 ENCOUNTER — LAB ENCOUNTER (OUTPATIENT)
Dept: LAB | Age: 49
End: 2024-05-18
Attending: INTERNAL MEDICINE

## 2024-05-18 DIAGNOSIS — R73.09 ELEVATED HEMOGLOBIN A1C: ICD-10-CM

## 2024-05-18 LAB
EST. AVERAGE GLUCOSE BLD GHB EST-MCNC: 120 MG/DL (ref 68–126)
HBA1C MFR BLD: 5.8 % (ref ?–5.7)

## 2024-05-18 PROCEDURE — 83036 HEMOGLOBIN GLYCOSYLATED A1C: CPT

## 2024-05-18 PROCEDURE — 36415 COLL VENOUS BLD VENIPUNCTURE: CPT

## 2024-05-30 ENCOUNTER — OFFICE VISIT (OUTPATIENT)
Dept: OBGYN CLINIC | Facility: CLINIC | Age: 49
End: 2024-05-30

## 2024-05-30 VITALS
SYSTOLIC BLOOD PRESSURE: 116 MMHG | BODY MASS INDEX: 28 KG/M2 | HEART RATE: 100 BPM | DIASTOLIC BLOOD PRESSURE: 68 MMHG | WEIGHT: 150.63 LBS

## 2024-05-30 DIAGNOSIS — Z12.4 CERVICAL CANCER SCREENING: ICD-10-CM

## 2024-05-30 DIAGNOSIS — Z01.419 WELL WOMAN EXAM WITH ROUTINE GYNECOLOGICAL EXAM: Primary | ICD-10-CM

## 2024-05-30 PROCEDURE — 87624 HPV HI-RISK TYP POOLED RSLT: CPT | Performed by: NURSE PRACTITIONER

## 2024-05-30 PROCEDURE — 88175 CYTOPATH C/V AUTO FLUID REDO: CPT | Performed by: NURSE PRACTITIONER

## 2024-05-30 PROCEDURE — 99396 PREV VISIT EST AGE 40-64: CPT | Performed by: NURSE PRACTITIONER

## 2024-05-30 NOTE — PROGRESS NOTES
Here for Routine Annual Exam  No concerns or questions.  Menses are absent now greater than 1 year.  She is up to date with colonoscopy and mammogram    ROS: No Cardiac, Respiratory, GI,  or Neurological symptoms.    PE:  GENERAL: well developed, well nourished, in no apparent distress  SKIN: no rashes, no suspicious lesions  HEENT: normal  NECK: supple; no thyroidmegaly, no adenopathy  LUNGS: clear to auscultation  CARDIOVASCULAR: normal S1, S2, RRR  BREASTS: firm, nontendder, no palpable masses or nodes, no nipple discharge, no skin changes, no axillary adenopathy,    ABDOMEN: Soft, non distended; non tender, no masses  GYNE/: External Genitalia: Normal without lesions or erythema                      Vagina: normal without lesions, atrophic                      Uterus: mid, mobile, non tender, normal size                     Cervix: no lesions or CMT                     Adnexa: non tender, no masses, normal size  EXTREMITIES:  non tender without edema    A/P:   1. Well woman exam with routine gynecological exam  Regular self breast exams recommended  Increase calcium intake and weight bearing exercises    2. Cervical cancer screening  - ThinPrep PAP with HPV Reflex Request; Future     Return to clinic 1 year for routine exam, or as needed with any concerns or question

## 2024-06-04 LAB
.: NORMAL
.: NORMAL

## 2024-06-05 LAB — HPV I/H RISK 1 DNA SPEC QL NAA+PROBE: NEGATIVE

## 2024-07-30 ENCOUNTER — MOBILE ENCOUNTER (OUTPATIENT)
Dept: INTERNAL MEDICINE CLINIC | Facility: CLINIC | Age: 49
End: 2024-07-30

## 2024-07-30 RX ORDER — MOLNUPIRAVIR 200 MG/1
800 CAPSULE ORAL EVERY 12 HOURS
Qty: 40 CAPSULE | Refills: 0 | Status: SHIPPED | OUTPATIENT
Start: 2024-07-30 | End: 2024-07-30

## 2024-07-30 RX ORDER — MOLNUPIRAVIR 200 MG/1
800 CAPSULE ORAL EVERY 12 HOURS
Qty: 40 CAPSULE | Refills: 0 | Status: SHIPPED | OUTPATIENT
Start: 2024-07-30 | End: 2024-07-31 | Stop reason: RX

## 2024-07-31 NOTE — PROGRESS NOTES
Received page from patient.  Patient with COVID-like symptoms.  Tested negative for COVID however  and son both are positive.  Will treat for COVID.  Medication sent to pharmacy.  Discussed when to seek urgent medical attention.  *initially Lagevrio was sent in to avoid interaction with atorvastatin but it was not available at pharmacy so Paxlovid sent in and pt advised regarding how to hold atorvastatin.

## 2024-11-11 ENCOUNTER — OFFICE VISIT (OUTPATIENT)
Dept: INTERNAL MEDICINE CLINIC | Facility: CLINIC | Age: 49
End: 2024-11-11
Payer: COMMERCIAL

## 2024-11-11 VITALS
BODY MASS INDEX: 27.47 KG/M2 | RESPIRATION RATE: 16 BRPM | WEIGHT: 151.19 LBS | TEMPERATURE: 98 F | HEART RATE: 64 BPM | DIASTOLIC BLOOD PRESSURE: 62 MMHG | HEIGHT: 62.25 IN | OXYGEN SATURATION: 100 % | SYSTOLIC BLOOD PRESSURE: 106 MMHG

## 2024-11-11 DIAGNOSIS — R73.03 PRE-DIABETES: ICD-10-CM

## 2024-11-11 DIAGNOSIS — E78.00 PURE HYPERCHOLESTEROLEMIA: ICD-10-CM

## 2024-11-11 DIAGNOSIS — Z00.00 LABORATORY EXAM ORDERED AS PART OF ROUTINE GENERAL MEDICAL EXAMINATION: ICD-10-CM

## 2024-11-11 DIAGNOSIS — Z00.00 ENCOUNTER FOR ROUTINE ADULT MEDICAL EXAMINATION: Primary | ICD-10-CM

## 2024-11-11 DIAGNOSIS — Z12.31 SCREENING MAMMOGRAM FOR BREAST CANCER: ICD-10-CM

## 2024-11-11 PROCEDURE — 99396 PREV VISIT EST AGE 40-64: CPT | Performed by: INTERNAL MEDICINE

## 2024-11-11 NOTE — PROGRESS NOTES
[facility] Internal Medicine Office Note  Chief Complaint:   Chief Complaint   Patient presents with    Physical       HPI:   This is a 49 year old female coming in for physical  HPI  She feels well     HL - on statin    She wants to move up her annual physical earlier in the year and notes her insurance pays for it anytime in the calendar year     Patient Active Problem List   Diagnosis    Special screening for malignant neoplasm of colon     Past Surgical History:   Procedure Laterality Date    Colonoscopy      Sheila localization wire 1 site left (cpt=19281) Left 2017    benign    Sheila localization wire 1 site right (cpt=19281) Right 1988    benign    Needle biopsy left Left 2017    benign.    Other      melanin removal from right palm     Family History   Problem Relation Age of Onset    Stroke Mother     Heart Attack Mother     Hypertension Mother     Diabetes Mother     Dementia Mother         Started few years ago but worsen by COVID    Other (Other) Daughter         Ebf3    Diabetes Maternal Aunt     Diabetes Maternal Uncle     Lipids Sister     Lipids Brother         I reviewed her's Past Medical History, Past Surgical History, Family History and   Social History updated shows  Social History     Socioeconomic History    Marital status:    Tobacco Use    Smoking status: Never    Smokeless tobacco: Never   Vaping Use    Vaping status: Never Used   Substance and Sexual Activity    Alcohol use: Never    Drug use: Never    Sexual activity: Not Currently     Partners: Male   Other Topics Concern    Caffeine Concern Yes    Exercise Yes    Seat Belt Yes     Allergies:  Allergies[1]  Current Outpatient Medications   Medication Sig Dispense Refill    ATORVASTATIN 10 MG Oral Tab TAKE 1 TABLET DAILY 90 tablet 3         REVIEW OF SYSTEMS:   Review of Systems   Constitutional:  Negative for fever.   HENT:  Negative for congestion.    Eyes:  Negative for visual disturbance.   Respiratory:  Negative for  shortness of breath.    Cardiovascular:  Negative for chest pain.   Gastrointestinal:  Negative for constipation.   Genitourinary:  Negative for dysuria.   Neurological: Negative.    Hematological: Negative.    Psychiatric/Behavioral: Negative.          EXAM:   /62   Pulse 64   Temp 97.7 °F (36.5 °C) (Temporal)   Resp 16   Ht 5' 2.25\" (1.581 m)   Wt 151 lb 3.2 oz (68.6 kg)   SpO2 100%   BMI 27.43 kg/m²  Estimated body mass index is 27.43 kg/m² as calculated from the following:    Height as of this encounter: 5' 2.25\" (1.581 m).    Weight as of this encounter: 151 lb 3.2 oz (68.6 kg).   Vital signs reviewed. Appears stated age, well groomed.  Physical Exam  Vitals reviewed.   Constitutional:       General: She is not in acute distress.     Appearance: She is well-developed.   HENT:      Head: Normocephalic and atraumatic.      Right Ear: Tympanic membrane normal.      Left Ear: Tympanic membrane normal.   Eyes:      Conjunctiva/sclera: Conjunctivae normal.   Cardiovascular:      Rate and Rhythm: Normal rate and regular rhythm.      Heart sounds: Normal heart sounds.   Pulmonary:      Effort: Pulmonary effort is normal.      Breath sounds: Normal breath sounds.   Abdominal:      Palpations: Abdomen is soft.   Musculoskeletal:      Cervical back: Neck supple.      Right lower leg: No edema.      Left lower leg: No edema.   Skin:     General: Skin is warm and dry.   Neurological:      General: No focal deficit present.      Mental Status: She is alert.   Psychiatric:         Mood and Affect: Mood normal.          ASSESSMENT AND PLAN:   Skylar Duong is a 49 year old female with  1. Encounter for routine adult medical examination    2. Laboratory exam ordered as part of routine general medical examination    3. Pre-diabetes    4. Pure hypercholesterolemia          The plan is as follows  Skylar was seen today for physical.    Diagnoses and all orders for this visit:    Encounter for routine adult medical  examination  -mammo due in 2/2025  -pap smear due in 5/2027  -colonoscopy due in 10/2033    Laboratory exam ordered as part of routine general medical examination  -     CBC With Differential With Platelet; Future  -     Comp Metabolic Panel (14); Future  -     Lipid Panel; Future  -     TSH W Reflex To Free T4; Future    Pre-diabetes -discussed low carb diet and exercise can help. Check A1c. Last was 5.8  -     Hemoglobin A1C; Future    Pure hypercholesterolemia -cont statin; check labs         Orders Placed This Encounter   Procedures    CBC With Differential With Platelet    Comp Metabolic Panel (14)    Lipid Panel    TSH W Reflex To Free T4    Hemoglobin A1C       Meds & Refills for this Visit:  Requested Prescriptions      No prescriptions requested or ordered in this encounter       Imaging & Consults:  None    Health Maintenance Due   Topic Date Due    DTaP,Tdap,and Td Vaccines (1 - Tdap) 05/30/2017    Zoster Vaccines (1 of 2) Never done    Annual Physical  11/27/2024     Patient/Caregiver Education: Patient/Caregiver Education: There are no barriers to learning. Medical education done. Outcome: Patient verbalizes understanding. Patient is notified to call with any questions, complications, allergies, or worsening or changing symptoms.  Patient is to call with any side effects or complications from the treatments as a result of today.     Keely Kelley MD       [1]   Allergies  Allergen Reactions    Radiology Contrast Iodinated Dyes HIVES    Eggplant UNKNOWN    Amoxicillin-Pot Clavulanate DIARRHEA     augmentin only per pt     Latex RASH

## 2024-11-12 ENCOUNTER — LAB ENCOUNTER (OUTPATIENT)
Dept: LAB | Age: 49
End: 2024-11-12
Attending: INTERNAL MEDICINE
Payer: COMMERCIAL

## 2024-11-12 DIAGNOSIS — R73.03 PRE-DIABETES: ICD-10-CM

## 2024-11-12 DIAGNOSIS — Z00.00 LABORATORY EXAM ORDERED AS PART OF ROUTINE GENERAL MEDICAL EXAMINATION: ICD-10-CM

## 2024-11-12 LAB
ALBUMIN SERPL-MCNC: 4.4 G/DL (ref 3.2–4.8)
ALBUMIN/GLOB SERPL: 1.1 {RATIO} (ref 1–2)
ALP LIVER SERPL-CCNC: 32 U/L
ALT SERPL-CCNC: 16 U/L
ANION GAP SERPL CALC-SCNC: 5 MMOL/L (ref 0–18)
AST SERPL-CCNC: 20 U/L (ref ?–34)
BASOPHILS # BLD AUTO: 0.04 X10(3) UL (ref 0–0.2)
BASOPHILS NFR BLD AUTO: 0.7 %
BILIRUB SERPL-MCNC: 0.8 MG/DL (ref 0.3–1.2)
BUN BLD-MCNC: 12 MG/DL (ref 9–23)
CALCIUM BLD-MCNC: 10.1 MG/DL (ref 8.7–10.4)
CHLORIDE SERPL-SCNC: 103 MMOL/L (ref 98–112)
CHOLEST SERPL-MCNC: 167 MG/DL (ref ?–200)
CO2 SERPL-SCNC: 31 MMOL/L (ref 21–32)
CREAT BLD-MCNC: 0.57 MG/DL
EGFRCR SERPLBLD CKD-EPI 2021: 110 ML/MIN/1.73M2 (ref 60–?)
EOSINOPHIL # BLD AUTO: 0.06 X10(3) UL (ref 0–0.7)
EOSINOPHIL NFR BLD AUTO: 1.1 %
ERYTHROCYTE [DISTWIDTH] IN BLOOD BY AUTOMATED COUNT: 13.2 %
EST. AVERAGE GLUCOSE BLD GHB EST-MCNC: 123 MG/DL (ref 68–126)
FASTING PATIENT LIPID ANSWER: YES
FASTING STATUS PATIENT QL REPORTED: YES
GLOBULIN PLAS-MCNC: 3.9 G/DL (ref 2–3.5)
GLUCOSE BLD-MCNC: 97 MG/DL (ref 70–99)
HBA1C MFR BLD: 5.9 % (ref ?–5.7)
HCT VFR BLD AUTO: 40.1 %
HDLC SERPL-MCNC: 60 MG/DL (ref 40–59)
HGB BLD-MCNC: 13 G/DL
IMM GRANULOCYTES # BLD AUTO: 0.01 X10(3) UL (ref 0–1)
IMM GRANULOCYTES NFR BLD: 0.2 %
LDLC SERPL CALC-MCNC: 94 MG/DL (ref ?–100)
LYMPHOCYTES # BLD AUTO: 2.3 X10(3) UL (ref 1–4)
LYMPHOCYTES NFR BLD AUTO: 42.6 %
MCH RBC QN AUTO: 28 PG (ref 26–34)
MCHC RBC AUTO-ENTMCNC: 32.4 G/DL (ref 31–37)
MCV RBC AUTO: 86.4 FL
MONOCYTES # BLD AUTO: 0.34 X10(3) UL (ref 0.1–1)
MONOCYTES NFR BLD AUTO: 6.3 %
NEUTROPHILS # BLD AUTO: 2.65 X10 (3) UL (ref 1.5–7.7)
NEUTROPHILS # BLD AUTO: 2.65 X10(3) UL (ref 1.5–7.7)
NEUTROPHILS NFR BLD AUTO: 49.1 %
NONHDLC SERPL-MCNC: 107 MG/DL (ref ?–130)
OSMOLALITY SERPL CALC.SUM OF ELEC: 288 MOSM/KG (ref 275–295)
PLATELET # BLD AUTO: 237 10(3)UL (ref 150–450)
POTASSIUM SERPL-SCNC: 4.2 MMOL/L (ref 3.5–5.1)
PROT SERPL-MCNC: 8.3 G/DL (ref 5.7–8.2)
RBC # BLD AUTO: 4.64 X10(6)UL
SODIUM SERPL-SCNC: 139 MMOL/L (ref 136–145)
TRIGL SERPL-MCNC: 66 MG/DL (ref 30–149)
TSI SER-ACNC: 2.17 UIU/ML (ref 0.55–4.78)
VLDLC SERPL CALC-MCNC: 11 MG/DL (ref 0–30)
WBC # BLD AUTO: 5.4 X10(3) UL (ref 4–11)

## 2024-11-12 PROCEDURE — 36415 COLL VENOUS BLD VENIPUNCTURE: CPT

## 2024-11-12 PROCEDURE — 85025 COMPLETE CBC W/AUTO DIFF WBC: CPT

## 2024-11-12 PROCEDURE — 80053 COMPREHEN METABOLIC PANEL: CPT

## 2024-11-12 PROCEDURE — 83036 HEMOGLOBIN GLYCOSYLATED A1C: CPT

## 2024-11-12 PROCEDURE — 84443 ASSAY THYROID STIM HORMONE: CPT

## 2024-11-12 PROCEDURE — 80061 LIPID PANEL: CPT

## 2024-11-13 ENCOUNTER — TELEPHONE (OUTPATIENT)
Dept: INTERNAL MEDICINE CLINIC | Facility: CLINIC | Age: 49
End: 2024-11-13

## 2024-11-13 NOTE — TELEPHONE ENCOUNTER
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

## 2024-11-19 ENCOUNTER — TELEPHONE (OUTPATIENT)
Dept: INTERNAL MEDICINE CLINIC | Facility: CLINIC | Age: 49
End: 2024-11-19

## 2024-11-19 NOTE — TELEPHONE ENCOUNTER
Calling about Atorvastatin. Asking about medication needing to be sent through Nav[facility] Ortiz, or Dr. Ho. Stated that they have discussed this with the patient as well. Asking If LS would be okay with the change. Stated we can call back with any questions or if we need clarification

## 2024-12-23 ENCOUNTER — HOSPITAL ENCOUNTER (OUTPATIENT)
Dept: GENERAL RADIOLOGY | Age: 49
Discharge: HOME | End: 2024-12-23
Attending: INTERNAL MEDICINE
Payer: COMMERCIAL

## 2024-12-23 ENCOUNTER — OFFICE VISIT (OUTPATIENT)
Dept: INTERNAL MEDICINE CLINIC | Facility: CLINIC | Age: 49
End: 2024-12-23
Payer: COMMERCIAL

## 2024-12-23 ENCOUNTER — TELEPHONE (OUTPATIENT)
Dept: INTERNAL MEDICINE CLINIC | Facility: CLINIC | Age: 49
End: 2024-12-23

## 2024-12-23 VITALS
WEIGHT: 152.19 LBS | SYSTOLIC BLOOD PRESSURE: 122 MMHG | BODY MASS INDEX: 27.65 KG/M2 | TEMPERATURE: 98 F | HEART RATE: 73 BPM | OXYGEN SATURATION: 98 % | HEIGHT: 62.25 IN | DIASTOLIC BLOOD PRESSURE: 78 MMHG

## 2024-12-23 DIAGNOSIS — R07.81 RIB PAIN ON LEFT SIDE: ICD-10-CM

## 2024-12-23 DIAGNOSIS — R07.81 RIB PAIN ON LEFT SIDE: Primary | ICD-10-CM

## 2024-12-23 PROCEDURE — 99213 OFFICE O/P EST LOW 20 MIN: CPT | Performed by: INTERNAL MEDICINE

## 2024-12-23 PROCEDURE — 71100 X-RAY EXAM RIBS UNI 2 VIEWS: CPT | Performed by: INTERNAL MEDICINE

## 2024-12-23 NOTE — PROGRESS NOTES
[facility] Internal Medicine Office Note  Chief Complaint:   Chief Complaint   Patient presents with    Follow - Up       HPI:   This is a 49 year old female coming in for pain   HPI  When she lays on her side, she feels pain in the left side of her lateral back and armpit area   No rash   She notes she had a fall recently onto her left side and left elbow on the stairs a few months ago but not recently  It had been painful if she pressed on the area.   Symptoms present for a few weeks     She took tylenol a few times for severe body aches on 12/7 and 12/8. This lasted a few days and then resolved. These symptoms happened after the body ache symptoms.   She felt chills.           Patient Active Problem List   Diagnosis    Special screening for malignant neoplasm of colon     Past Surgical History:   Procedure Laterality Date    Colonoscopy      Sheila localization wire 1 site left (cpt=19281) Left 2017    benign    Sheila localization wire 1 site right (cpt=19281) Right 1988    benign    Needle biopsy left Left 2017    benign.    Other      melanin removal from right palm     Family History   Problem Relation Age of Onset    Stroke Mother     Heart Attack Mother     Hypertension Mother     Diabetes Mother     Dementia Mother         Started few years ago but worsen by COVID    Other (Other) Daughter         Ebf3    Diabetes Maternal Aunt     Diabetes Maternal Uncle     Lipids Sister     Lipids Brother         I reviewed her's Past Medical History, Past Surgical History, Family History and   Social History updated shows  Social History     Socioeconomic History    Marital status:    Tobacco Use    Smoking status: Never    Smokeless tobacco: Never   Vaping Use    Vaping status: Never Used   Substance and Sexual Activity    Alcohol use: Never    Drug use: Never    Sexual activity: Not Currently     Partners: Male   Other Topics Concern    Caffeine Concern Yes    Exercise Yes    Seat Belt Yes      Allergies:  Allergies[1]  Current Outpatient Medications   Medication Sig Dispense Refill    ATORVASTATIN 10 MG Oral Tab TAKE 1 TABLET DAILY 90 tablet 3         REVIEW OF SYSTEMS:   Review of Systems   Constitutional:  Negative for fever.   HENT:  Negative for congestion.    Eyes:  Negative for visual disturbance.   Respiratory:  Negative for shortness of breath.    Cardiovascular:  Negative for chest pain.   Gastrointestinal:  Negative for constipation.   Genitourinary:  Negative for dysuria.   Neurological: Negative.    Hematological: Negative.    Psychiatric/Behavioral: Negative.          EXAM:   /78 (BP Location: Right arm, Patient Position: Sitting, Cuff Size: adult)   Pulse 73   Temp 97.9 °F (36.6 °C) (Temporal)   Ht 5' 2.25\" (1.581 m)   Wt 152 lb 3.2 oz (69 kg)   SpO2 98%   BMI 27.61 kg/m²  Estimated body mass index is 27.61 kg/m² as calculated from the following:    Height as of this encounter: 5' 2.25\" (1.581 m).    Weight as of this encounter: 152 lb 3.2 oz (69 kg).   Vital signs reviewed. Appears stated age, well groomed.  Physical Exam  Vitals reviewed.   Constitutional:       General: She is not in acute distress.     Appearance: She is well-developed.   HENT:      Head: Normocephalic and atraumatic.   Eyes:      Conjunctiva/sclera: Conjunctivae normal.   Cardiovascular:      Rate and Rhythm: Normal rate and regular rhythm.      Heart sounds: Normal heart sounds.   Pulmonary:      Effort: Pulmonary effort is normal.      Breath sounds: Normal breath sounds.   Chest:   Breasts:     Right: Normal.      Left: Normal.   Musculoskeletal:      Cervical back: Neck supple.   Lymphadenopathy:      Upper Body:      Right upper body: No axillary adenopathy.      Left upper body: No axillary adenopathy.   Skin:     General: Skin is warm and dry.   Neurological:      General: No focal deficit present.      Mental Status: She is alert.   Psychiatric:         Mood and Affect: Mood normal.           ASSESSMENT AND PLAN:   Skylar Duong is a 49 year old female with  1. Rib pain on left side          The plan is as follows  Skylar was seen today for follow - up.    Diagnoses and all orders for this visit:    Rib pain on left side - tender to palpation along left ribs so will check X-ray. Pain worse with certain movements. Will treat with 10 day course of antiinflammatory. Discussed risks of stomach side effects and bleeding. Aleve 1-2 tab q12 hr. Let me know if pain does not resolve.   -    XR RIBS, UNILATERAL (2 VIEWS), LEFT (CPT=71100); Future    Meds & Refills for this Visit:  Requested Prescriptions      No prescriptions requested or ordered in this encounter       Imaging & Consults:  None    Health Maintenance Due   Topic Date Due    DTaP,Tdap,and Td Vaccines (1 - Tdap) 05/30/2017    Zoster Vaccines (1 of 2) Never done    COVID-19 Vaccine (6 - 2024-25 season) 09/01/2024    Mammogram  02/16/2025     Patient/Caregiver Education: Patient/Caregiver Education: There are no barriers to learning. Medical education done. Outcome: Patient verbalizes understanding. Patient is notified to call with any questions, complications, allergies, or worsening or changing symptoms.  Patient is to call with any side effects or complications from the treatments as a result of today.     Keely Kelley MD         [1]   Allergies  Allergen Reactions    Radiology Contrast Iodinated Dyes HIVES    Eggplant UNKNOWN    Amoxicillin-Pot Clavulanate DIARRHEA     augmentin only per pt     Latex RASH

## 2024-12-23 NOTE — PATIENT INSTRUCTIONS
X-ray today     Aleve 1-2 tablets in the morning and 1-2 tablets in the evening as needed. Take with food.   Could also take 400mg or 600mg of ibuprofen as needed

## 2024-12-23 NOTE — TELEPHONE ENCOUNTER
Future Appointments   Date Time Provider Department Center   12/23/2024 11:45 AM Keely Kelley MD [facility] 8 [facility] [place]   2/22/2025  8:00 AM PF LEXX RM1 PF [facility] Marie     Pt confirmed

## 2024-12-23 NOTE — TELEPHONE ENCOUNTER
Appointment Request From: Skylar Duong     With Provider: Keely Kelley [[facility]]     Preferred Date Range: 12/23/2024 - 12/24/2024     Preferred Times: Any Time     Reason for visit: Primary Care - In Person Visit     Comments:  I have pain on left under arm and on the left side rib area under my arm.

## 2025-02-05 ENCOUNTER — NURSE TRIAGE (OUTPATIENT)
Dept: INTERNAL MEDICINE CLINIC | Facility: CLINIC | Age: 50
End: 2025-02-05

## 2025-02-05 NOTE — TELEPHONE ENCOUNTER
Can try otc Aleve 1-2 tablets at onset of headache if no contraindications to taking NSAIDs such as stomach ulcer etc. Take with food.

## 2025-02-05 NOTE — TELEPHONE ENCOUNTER
Action Requested: Summary for Provider     []  Critical Lab, Recommendations Needed   Need Additional Advice  [x]   FYI    []   Need Orders  [] Need Medications Sent to Pharmacy  []  Other     SUMMARY: pt has had headache for 2 - 3 days. Pt set up appt for Monday. Any further recs?    Pt stated about 2 days ago she started having a headache. Pt states it a 4/5 out of 10 pain.   Feels it mostly on right side, sinus area to back of head.   No fever.   No cough.   Head feels \"full\", but no runny nose or congestion.     Pt has been taking Airborne daily, but no pain relievers/decongestants.     Advised pt to  Covid/Flu test otc. To rule out those viruses.  If positive to call us back.     Pt did set up appt for Monday.     Advised on home care: can try tylenol/advil. Otc decongestants. Cold compresses (or warm if tolerates better).     Pt states she did have deep cleaning dental on Wednesday last week. HA's started about 4 days later.     Reason for call: Sick Call (headaches)  Onset: 2-3 days                 Future Appointments   Date Time Provider Department Center   2/10/2025  2:30 PM Keely Kelley MD [facility] 8 [facility] [place]   2/22/2025  8:00 AM PF LEXX RM1 PF [facility]         Reason for Disposition   New headache and age > 50    Protocols used: Headache-A-OH

## 2025-02-05 NOTE — TELEPHONE ENCOUNTER
Spoke to patient with PCP recommendations. Patient has not had contraindications when taking NSAIDs.Patient verbalized understanding.    Dr. Ezequiel CABELLO

## 2025-02-19 ENCOUNTER — TELEPHONE (OUTPATIENT)
Dept: INTERNAL MEDICINE CLINIC | Facility: CLINIC | Age: 50
End: 2025-02-19

## 2025-02-19 NOTE — TELEPHONE ENCOUNTER
LS: Please see below. Spoke with patient. Would you like to send partial refill to local [facility]'s pharmacy? Patient currently receives atorvastatin through [facility]. [facility] sent several refills early,  2024. This RN asked patient to call [facility] for refill as well.     [id number] - [place], [place] - [address] [place], [place] [address]MELLO [facility] AT [facility] [address], [phone], [phone] [address] [facility] [address]   Phone: [phone] Fax: [phone]     From call to MA:   \"Pt is requesting a urgent refill for 'atorvastatin'. Pt says they have been sending it every month for 30 day supply instead of one time for 90 day supply so now her med is .     Pt says it will take time to receive the med from [facility], so she wants to know if a partial refill should be sent to [facility] as well? Please advise.     [facility] - [facility] [address] [place] [address] [phone], [phone] [[id number]] \"

## 2025-02-19 NOTE — TELEPHONE ENCOUNTER
Pt is requesting a urgent refill for 'atorvastatin'. Pt says they have been sending it every month for 30 day supply instead of one time for 90 day supply so now her med is .    Pt says it will take time to receive the med from [facility], so she wants to know if a partial refill should be sent to [facility] as well? Please advise.    [facility] - [address] [phone], [phone] [[id number]]

## 2025-02-20 RX ORDER — ATORVASTATIN CALCIUM 10 MG/1
TABLET, FILM COATED ORAL
Qty: 30 TABLET | Refills: 0 | Status: SHIPPED | OUTPATIENT
Start: 2025-02-20

## 2025-02-20 RX ORDER — ATORVASTATIN CALCIUM 10 MG/1
TABLET, FILM COATED ORAL
Qty: 90 TABLET | Refills: 3 | Status: SHIPPED | OUTPATIENT
Start: 2025-02-20

## 2025-02-20 NOTE — TELEPHONE ENCOUNTER
Protocol passed     LOV: 11/11/24   RTC: none noted  Filled: 5/17/24 #90 3 refill   Labs: 11/12/24   Future Appointments   Date Time Provider Department Center   2/22/2025  8:00 AM JOHN HALLMAN RM1 JOHN Marie

## 2025-02-22 ENCOUNTER — HOSPITAL ENCOUNTER (OUTPATIENT)
Dept: MAMMOGRAPHY | Age: 50
Discharge: HOME | End: 2025-02-22
Attending: INTERNAL MEDICINE
Payer: COMMERCIAL

## 2025-02-22 DIAGNOSIS — Z12.31 SCREENING MAMMOGRAM FOR BREAST CANCER: ICD-10-CM

## 2025-02-22 PROCEDURE — 77067 SCR MAMMO BI INCL CAD: CPT | Performed by: INTERNAL MEDICINE

## 2025-02-22 PROCEDURE — 77063 BREAST TOMOSYNTHESIS BI: CPT | Performed by: INTERNAL MEDICINE

## 2025-02-24 ENCOUNTER — TELEPHONE (OUTPATIENT)
Dept: INTERNAL MEDICINE CLINIC | Facility: CLINIC | Age: 50
End: 2025-02-24

## 2025-02-24 NOTE — TELEPHONE ENCOUNTER
Patient called back and states [facility] told her they have been calling our office for clarification on the medication as it is no longer available.   Patient states Roy told her that because another pharmacy has filled it they are not able to fill medication until prescriptions are cancelled.   Again explained to patient that I would call the pharmacy to clarify as we have not received any calls.   Also per patient Kirill informed her that rx was received on 2/22.   Also tried to search for good rx coupon for patient to use if in need of medication.

## 2025-02-24 NOTE — TELEPHONE ENCOUNTER
Spoke with Charlotte, from [facility] states received rx from our office on 2/20 from Dr Kelley.   Not going through insurance because there is already another paid claim on it so filled as too early. Self pay price is $20 w/discount card, cash price is $68    Called [facility] - they state  for Atorvastatin stopped making medication. Informed pharmacist that patient can take rx from any . He states he will call patient to inform. I called patient and provided an update to her of information given from [employer].    Alert/Cooperative/Awake

## 2025-02-24 NOTE — TELEPHONE ENCOUNTER
Patient is calling upset in regards to her refill request for Atorvastatin. States that she requested a refill and was not notified of authorization.   Informed patient that Atorvastatin refill was sent to [facility] and [facility] on 2/20/25 and the pharmacy should have notified her when the medication was ready to be picked up. Patient states has not been notified and they informed her the prescription is not available and they have been trying to reach out office. Informed patient that I can contact the pharmacy at some time today and call her back. Patient hung up while saying some verbage about needing to do everything on her own now a days.

## 2025-03-18 ENCOUNTER — TELEPHONE (OUTPATIENT)
Dept: INTERNAL MEDICINE CLINIC | Facility: CLINIC | Age: 50
End: 2025-03-18

## 2025-03-18 RX ORDER — OSELTAMIVIR PHOSPHATE 75 MG/1
75 CAPSULE ORAL 2 TIMES DAILY
Qty: 10 CAPSULE | Refills: 0 | Status: SHIPPED | OUTPATIENT
Start: 2025-03-18

## 2025-03-18 NOTE — TELEPHONE ENCOUNTER
Pt called in stating son had influenza B last week, tested + on Thursday.  Pt symptoms started today with runny nose. No other symptoms. Has not used any OTC medications.  Pt wondering if she needs abx for this?

## 2025-03-18 NOTE — TELEPHONE ENCOUNTER
If patient has had direct exposure to her son (such as he lives at home), then recommend starting Tamiflu antiviral medication which helps symptoms to not be as severe and resolve more quickly than without medication. Nausea, diarrhea, and headache can be side effects. Usually people do well. Sent to [facility] on file.    (If she has not been around her son, then she should be tested prior to starting Tamiflu)

## 2025-03-18 NOTE — TELEPHONE ENCOUNTER
Patient made aware of PCP recommendations. Patient verbalized understanding. This RN recommended patient to complete the medication and continue home remedies such as staying hydrated, getting adequate rest, and trying a humidifier. if symptoms worsen she should go to an immediate care to be further evaluated. Patient verbalized understanding.

## 2025-07-25 ENCOUNTER — TELEPHONE (OUTPATIENT)
Dept: INTERNAL MEDICINE CLINIC | Facility: CLINIC | Age: 50
End: 2025-07-25

## 2025-07-25 NOTE — TELEPHONE ENCOUNTER
Per pt symptoms:  'I have been suffering nerve strain related ( probably) near my left back. Can’t wait till sept.'    After inquiring w/ LS, as the pt had asked for a vv originally, I was able to call and offer 8/4 at 11 am. Pt then asked for an in person and the next available w/ any of the providers wasn't until the end of August. Pt said she could not wait that long and what is a pt supposed to do in this situation 'not see a doctor'. I offered [facility] and the other PCP offices seeing our pts for acute visits but pt declined.    Pt says she will take care of it.  ________________________  Please triage in case UC or ED recommendation is necessary.

## 2025-07-26 ENCOUNTER — HOSPITAL ENCOUNTER (OUTPATIENT)
Age: 50
Discharge: HOME | End: 2025-07-26

## 2025-07-26 VITALS
HEIGHT: 62 IN | SYSTOLIC BLOOD PRESSURE: 121 MMHG | OXYGEN SATURATION: 99 % | BODY MASS INDEX: 27.6 KG/M2 | HEART RATE: 67 BPM | TEMPERATURE: 99 F | DIASTOLIC BLOOD PRESSURE: 69 MMHG | WEIGHT: 150 LBS | RESPIRATION RATE: 20 BRPM

## 2025-07-26 DIAGNOSIS — M54.32 SCIATICA, LEFT SIDE: ICD-10-CM

## 2025-07-26 DIAGNOSIS — G57.02 PIRIFORMIS SYNDROME OF LEFT SIDE: Primary | ICD-10-CM

## 2025-07-26 PROCEDURE — 99213 OFFICE O/P EST LOW 20 MIN: CPT

## 2025-07-26 PROCEDURE — 99203 OFFICE O/P NEW LOW 30 MIN: CPT

## 2025-07-26 NOTE — ED PROVIDER NOTES
History     Chief Complaint   Patient presents with    Back Pain     Radiating pain start with left back and goes to knee and elbow - Entered by patient       Subjective:   HPI    Skylar Duong, 50 year old female with notable medical history of n/a who presents with Left buttock / lower back / Left upper leg pain. Patient reports pain initially started approx 1-month ago with yard work, but resolved after a couple weeks. She reports re-aggravation this past week while returning from a vacation and having a long drive. Pain originates in Left buttock / lower back and radiates down leg. Denies falls, injury, trauma, incontinence.    Of note, patient performs a lot of work on the computer and is a seated position .      Problem List[1]   Objective:   Past Medical History:    Atypical mole    Blurred vision    Cataract    Constipation    Occasional    Exposure to TB    Heart palpitations    Occasional    High cholesterol    History of cardiac murmur    History of rheumatic fever    Wears glasses              Past Surgical History:   Procedure Laterality Date    Colonoscopy      Sheila localization wire 1 site left (cpt=19281) Left 2017    benign    Sheila localization wire 1 site right (cpt=19281) Right 1988    benign    Needle biopsy left Left 2017    benign.    Other      melanin removal from right palm                Social History     Socioeconomic History    Marital status:    Tobacco Use    Smoking status: Never    Smokeless tobacco: Never   Vaping Use    Vaping status: Never Used   Substance and Sexual Activity    Alcohol use: Never    Drug use: Never    Sexual activity: Not Currently     Partners: Male   Other Topics Concern    Caffeine Concern Yes    Exercise Yes    Seat Belt Yes              Medications Ordered Prior to Encounter[2]      Constitutional and vital signs reviewed.      All other systems reviewed and negative except as noted above.    I have reviewed the family history, social history,  allergies, and outpatient medications.     History reviewed from EMR: Encounters, problem list, allergies, medications      Physical Exam     ED Triage Vitals [07/26/25 1001]   /69   Pulse 67   Resp 20   Temp 99.1 °F (37.3 °C)   Temp src Oral   SpO2 99 %   O2 Device None (Room air)       Current:/69   Pulse 67   Temp 99.1 °F (37.3 °C) (Oral)   Resp 20   Ht 157.5 cm (5' 2\")   Wt 68 kg   LMP 05/07/2022 (Exact Date)   SpO2 99%   BMI 27.44 kg/m²       Physical Exam  Vitals and nursing note reviewed.   Constitutional:       General: She is not in acute distress.     Appearance: Normal appearance. She is normal weight. She is not ill-appearing or toxic-appearing.   HENT:      Head: Normocephalic and atraumatic.      Right Ear: External ear normal.      Left Ear: External ear normal.      Nose: Nose normal.      Mouth/Throat:      Mouth: Mucous membranes are moist.   Eyes:      Extraocular Movements: Extraocular movements intact.      Conjunctiva/sclera: Conjunctivae normal.      Pupils: Pupils are equal, round, and reactive to light.   Cardiovascular:      Rate and Rhythm: Normal rate.      Pulses: Normal pulses.   Pulmonary:      Effort: Pulmonary effort is normal. No respiratory distress.   Musculoskeletal:         General: No swelling, tenderness or signs of injury. Normal range of motion.      Cervical back: Normal range of motion and neck supple.        Legs:    Skin:     General: Skin is warm and dry.      Capillary Refill: Capillary refill takes less than 2 seconds.      Coloration: Skin is not jaundiced.   Neurological:      General: No focal deficit present.      Mental Status: She is alert and oriented to person, place, and time. Mental status is at baseline.   Psychiatric:         Mood and Affect: Mood normal.         Behavior: Behavior normal.         Thought Content: Thought content normal.         Judgment: Judgment normal.            ED Course     Labs Reviewed - No data to display  No  orders to display       Vitals:    07/26/25 1001   BP: 121/69   Pulse: 67   Resp: 20   Temp: 99.1 °F (37.3 °C)   TempSrc: Oral   SpO2: 99%   Weight: 68 kg   Height: 157.5 cm (5' 2\")            ARTHUR Duong, 50 year old female with medical history as noted above who presents with Left buttock / hip pain   - Patient in NAD, VSS   - piriformis syndrome vs sciatica vs arthritis vs other   - HPI and exam c/w myofascial etiology (piriformis / sciatica)   - Pain reproduced  with piriformis stretch   - Home piriformis / sciatica stretch guide provided   - Supportive care discussed   - Physical Therapy contact provided if needed       ** See ED course below for additional information on care provided / interventions / notable events throughout patient's encounter.           ** I have independently reviewed the radiology images, clinical lab results, and ECG tracings as described above (if applicable)    ** Concerning co-morbidities possibly affecting complaint / care: n/a        Medical Decision Making  Risk  OTC drugs.        Disposition and Plan     Disposition:  Discharge  7/26/2025 10:43 am    Clinical Impression:  1. Piriformis syndrome of left side    2. Sciatica, left side            Home care instructions:     - Perform separately provided piriformis / sciatica stretches a couple times a day, and remember to hold for 30-seconds    Sciatica / Lower Back care measures:   - Treatment should be prioritized to direct work to the area (e.g. stretching, massage, myofascial release, myofascial scraping, acupuncture, etc.)   - - Treatment may need to be performed consistently for weeks / months   - Proper body mechanics and posture are very important to not continue stress to lower back / hip regions (do not slouch, shoulder back, etc.)   - Drink plenty of filtered water (4-6 bottle equivalents)   - Naproxen for pain as needed (take with or after food)   - You may benefit from over-the-counter topical pain  medication such as: Voltaren, Icy/Hot, Biofreeze, Bengay, Lidocaine, CBD, etc.   - You may benefit from physical therapy   - You may benefit form Epsom salt warm water soaks   - Avoid excessive bending / twisting / lifting to promote healing        Follow-up:  [facility]  [address] [place] [id number]  [phone]    Physical Therapy contact if needed          Medications Prescribed:  Current Discharge Medication List            Taurus Wilson, DNP, APRN, AGACNP-BC, FNP-C, CNL  Adult-Gerontology Acute Care & Family Nurse Practitioner  [facility]      The above patient (and/or guardian) was made aware that an appropriate evaluation has been performed, and that no additional testing is required at this time. In my medical judgment, there is currently no evidence of an immediate life-threatening or surgical condition, therefore discharge is indicated at this time. The patient (and/or guardian) was advised that a small risk still exists that a serious condition could develop. The patient was instructed to arrange close follow-up with their primary care provider (or the referral provider given today). The patient received written and verbal instructions regarding their condition / concerns, demonstrated understanding, and is agreement with the outpatient treatment plan.              [1]   Patient Active Problem List  Diagnosis    Special screening for malignant neoplasm of colon   [2]   No current facility-administered medications on file prior to encounter.     Current Outpatient Medications on File Prior to Encounter   Medication Sig Dispense Refill    atorvastatin 10 MG Oral Tab TAKE 1 TABLET DAILY 30 tablet 0

## 2025-07-26 NOTE — DISCHARGE INSTRUCTIONS
- Perform separately provided piriformis / sciatica stretches a couple times a day, and remember to hold for 30-seconds    Sciatica / Lower Back care measures:   - Treatment should be prioritized to direct work to the area (e.g. stretching, massage, myofascial release, myofascial scraping, acupuncture, etc.)   - - Treatment may need to be performed consistently for weeks / months   - Proper body mechanics and posture are very important to not continue stress to lower back / hip regions (do not slouch, shoulder back, etc.)   - Drink plenty of filtered water (4-6 bottle equivalents)   - Naproxen for pain as needed (take with or after food)   - You may benefit from over-the-counter topical pain medication such as: Voltaren, Icy/Hot, Biofreeze, Bengay, Lidocaine, CBD, etc.   - You may benefit from physical therapy   - You may benefit form Epsom salt warm water soaks   - Avoid excessive bending / twisting / lifting to promote healing

## 2025-07-29 ENCOUNTER — TELEPHONE (OUTPATIENT)
Dept: INTERNAL MEDICINE CLINIC | Facility: CLINIC | Age: 50
End: 2025-07-29

## 2025-08-01 ENCOUNTER — HOSPITAL ENCOUNTER (OUTPATIENT)
Dept: GENERAL RADIOLOGY | Age: 50
Discharge: HOME | End: 2025-08-01
Attending: INTERNAL MEDICINE

## 2025-08-01 ENCOUNTER — OFFICE VISIT (OUTPATIENT)
Dept: INTERNAL MEDICINE CLINIC | Facility: CLINIC | Age: 50
End: 2025-08-01

## 2025-08-01 VITALS
HEIGHT: 62 IN | OXYGEN SATURATION: 98 % | TEMPERATURE: 98 F | RESPIRATION RATE: 16 BRPM | WEIGHT: 152.63 LBS | HEART RATE: 82 BPM | SYSTOLIC BLOOD PRESSURE: 108 MMHG | BODY MASS INDEX: 28.09 KG/M2 | DIASTOLIC BLOOD PRESSURE: 70 MMHG

## 2025-08-01 DIAGNOSIS — M54.42 ACUTE LEFT-SIDED LOW BACK PAIN WITH LEFT-SIDED SCIATICA: Primary | ICD-10-CM

## 2025-08-01 DIAGNOSIS — M54.6 CHRONIC BILATERAL THORACIC BACK PAIN: ICD-10-CM

## 2025-08-01 DIAGNOSIS — B35.4 TINEA CORPORIS: ICD-10-CM

## 2025-08-01 DIAGNOSIS — M54.42 ACUTE LEFT-SIDED LOW BACK PAIN WITH LEFT-SIDED SCIATICA: ICD-10-CM

## 2025-08-01 DIAGNOSIS — G89.29 CHRONIC BILATERAL THORACIC BACK PAIN: ICD-10-CM

## 2025-08-01 PROCEDURE — 72110 X-RAY EXAM L-2 SPINE 4/>VWS: CPT | Performed by: INTERNAL MEDICINE

## 2025-08-01 PROCEDURE — 99213 OFFICE O/P EST LOW 20 MIN: CPT | Performed by: INTERNAL MEDICINE

## 2025-08-01 RX ORDER — KETOCONAZOLE 20 MG/G
CREAM TOPICAL
Qty: 30 G | Refills: 0 | Status: SHIPPED | OUTPATIENT
Start: 2025-08-01

## 2025-08-04 ENCOUNTER — RESULTS FOLLOW-UP (OUTPATIENT)
Dept: INTERNAL MEDICINE CLINIC | Facility: CLINIC | Age: 50
End: 2025-08-04

## 2025-08-28 ENCOUNTER — TELEPHONE (OUTPATIENT)
Dept: PHYSICAL THERAPY | Facility: HOSPITAL | Age: 50
End: 2025-08-28

## 2025-08-29 ENCOUNTER — OFFICE VISIT (OUTPATIENT)
Dept: PHYSICAL THERAPY | Age: 50
End: 2025-08-29
Attending: INTERNAL MEDICINE

## 2025-08-29 DIAGNOSIS — M54.6 CHRONIC BILATERAL THORACIC BACK PAIN: ICD-10-CM

## 2025-08-29 DIAGNOSIS — G89.29 CHRONIC BILATERAL THORACIC BACK PAIN: ICD-10-CM

## 2025-08-29 DIAGNOSIS — M54.42 ACUTE LEFT-SIDED LOW BACK PAIN WITH LEFT-SIDED SCIATICA: Primary | ICD-10-CM

## 2025-08-29 PROCEDURE — 97161 PT EVAL LOW COMPLEX 20 MIN: CPT

## 2025-08-29 PROCEDURE — 97110 THERAPEUTIC EXERCISES: CPT

## (undated) NOTE — MR AVS SNAPSHOT
After Visit Summary   8/5/2021    Vedia Crigler    MRN: [id number]           Visit Information     Date & Time  8/5/2021  9:00 AM Provider  [facility] Department  [facility] [address], [address] [address], [place] Dept.  Phone  [phone] \"Scheduling Ticket\" that will be available in [employer] to schedule on your own at a time most convenient to you.       If you do not have a [employer] Account, or if you prefer to speak with someone to schedule your appointment, please call [facility] acute illness   or injury that are   non-life-threatening.   Also available by appointment     Average cost  $70*       [address]   Monday – Friday  10:00 am – 10:00 pm   [place]